# Patient Record
Sex: FEMALE | Race: WHITE | NOT HISPANIC OR LATINO | Employment: FULL TIME | URBAN - METROPOLITAN AREA
[De-identification: names, ages, dates, MRNs, and addresses within clinical notes are randomized per-mention and may not be internally consistent; named-entity substitution may affect disease eponyms.]

---

## 2019-03-08 ENCOUNTER — HOSPITAL ENCOUNTER (EMERGENCY)
Facility: HOSPITAL | Age: 24
Discharge: HOME | End: 2019-03-08
Attending: EMERGENCY MEDICINE | Admitting: EMERGENCY MEDICINE
Payer: COMMERCIAL

## 2019-03-08 VITALS
TEMPERATURE: 96.8 F | OXYGEN SATURATION: 97 % | RESPIRATION RATE: 16 BRPM | DIASTOLIC BLOOD PRESSURE: 73 MMHG | SYSTOLIC BLOOD PRESSURE: 138 MMHG | HEART RATE: 77 BPM | WEIGHT: 222.66 LBS

## 2019-03-08 DIAGNOSIS — N39.0 UTI (URINARY TRACT INFECTION): Primary | ICD-10-CM

## 2019-03-08 LAB
BACTERIA UR QL AUTO: ABNORMAL /HPF
BILIRUB UR QL STRIP: NEGATIVE
CLARITY UR: CLEAR
COLOR UR: ABNORMAL
EXT PREG TEST URINE: NEGATIVE
GLUCOSE UR STRIP-MCNC: NEGATIVE MG/DL
HGB UR QL STRIP.AUTO: 10
KETONES UR STRIP-MCNC: NEGATIVE MG/DL
LEUKOCYTE ESTERASE UR QL STRIP: 100
MUCOUS THREADS UR QL AUTO: ABNORMAL
NITRITE UR QL STRIP: NEGATIVE
NON-SQ EPI CELLS URNS QL MICRO: ABNORMAL /HPF
PH UR STRIP.AUTO: 5 [PH]
PROT UR STRIP-MCNC: NEGATIVE MG/DL
RBC #/AREA URNS AUTO: ABNORMAL /HPF
SP GR UR STRIP.AUTO: 1.02 (ref 1–1.04)
UROBILINOGEN UA: NEGATIVE MG/DL
WBC #/AREA URNS AUTO: ABNORMAL /HPF

## 2019-03-08 PROCEDURE — 81025 URINE PREGNANCY TEST: CPT | Performed by: PHYSICIAN ASSISTANT

## 2019-03-08 PROCEDURE — 87186 SC STD MICRODIL/AGAR DIL: CPT | Performed by: PHYSICIAN ASSISTANT

## 2019-03-08 PROCEDURE — 81003 URINALYSIS AUTO W/O SCOPE: CPT | Performed by: PHYSICIAN ASSISTANT

## 2019-03-08 PROCEDURE — 81001 URINALYSIS AUTO W/SCOPE: CPT | Performed by: PHYSICIAN ASSISTANT

## 2019-03-08 PROCEDURE — 87077 CULTURE AEROBIC IDENTIFY: CPT | Performed by: PHYSICIAN ASSISTANT

## 2019-03-08 PROCEDURE — 99284 EMERGENCY DEPT VISIT MOD MDM: CPT

## 2019-03-08 PROCEDURE — 87086 URINE CULTURE/COLONY COUNT: CPT | Performed by: PHYSICIAN ASSISTANT

## 2019-03-08 RX ORDER — NAPROXEN 500 MG/1
500 TABLET ORAL 2 TIMES DAILY WITH MEALS
Qty: 20 TABLET | Refills: 0 | Status: SHIPPED | OUTPATIENT
Start: 2019-03-08 | End: 2019-05-20 | Stop reason: ALTCHOICE

## 2019-03-08 RX ORDER — CEPHALEXIN 500 MG/1
500 CAPSULE ORAL EVERY 8 HOURS SCHEDULED
Qty: 30 CAPSULE | Refills: 0 | Status: SHIPPED | OUTPATIENT
Start: 2019-03-08 | End: 2019-03-18

## 2019-03-08 NOTE — ED PROVIDER NOTES
History  Chief Complaint   Patient presents with    Abdominal Pain     " on the left side since Tuesday, I thought it was a UTI and took AZO for it"  denies V/D  denies fevers     Patient is a 26year old female who presents today with a chief complaint of left lower quadrant abdominal pain which has been intermittent since Tuesday of this past week (3 days ago)  Patient reports she was drinking alcohol the night before and believed she was simply experiencing the after-effects of the alcohol on Tuesday  Patient reports the pain is a 'squeezing' in the lower left and mid pelvic area and was initially associated with dysuria for which she tried AZO over the counter medication which was ineffective at relieving the abdominal pain  Patient reports her abdominal pain was associated with one episode of vomiting however denies any further nausea, vomiting, diarrhea, or constipation  Patient reports she is able to eat and drink without problem and has had normal bowel movements with the last one being yesterday and normal in nature  Patient reports she is not sexually active and is not experiencing any vaginal discharge, discomfort, genital rashes or lesions  Patient denies any back or flank  pain associated with her pelvic / abdominal pain        Abdominal Pain   Pain location:  LLQ and suprapubic  Pain quality: squeezing    Pain radiates to:  Suprapubic region  Pain severity:  Mild  Onset quality:  Gradual  Duration:  3 days  Timing:  Intermittent  Progression:  Waxing and waning  Chronicity:  New  Context: alcohol use    Relieved by:  Nothing  Worsened by:  Nothing  Ineffective treatments:  OTC medications (azo)  Associated symptoms: dysuria ( few episodes at onset of abd pain) and vomiting ( one episode at onset of abd pain)    Associated symptoms: no chest pain, no chills, no constipation, no diarrhea, no fatigue, no fever, no hematemesis, no hematochezia, no hematuria, no nausea, no shortness of breath, no sore throat and no vaginal discharge        None       History reviewed  No pertinent past medical history  History reviewed  No pertinent surgical history  History reviewed  No pertinent family history  I have reviewed and agree with the history as documented  Social History     Tobacco Use    Smoking status: Never Smoker    Smokeless tobacco: Never Used   Substance Use Topics    Alcohol use: Yes    Drug use: Not Currently        Review of Systems   Constitutional: Negative for chills, fatigue and fever  HENT: Negative for congestion, ear pain, rhinorrhea and sore throat  Eyes: Negative for redness  Respiratory: Negative for chest tightness and shortness of breath  Cardiovascular: Negative for chest pain and palpitations  Gastrointestinal: Positive for abdominal pain and vomiting ( one episode at onset of abd pain)  Negative for constipation, diarrhea, hematemesis, hematochezia and nausea  Genitourinary: Positive for dysuria ( few episodes at onset of abd pain)  Negative for difficulty urinating, hematuria and vaginal discharge  Musculoskeletal: Negative  Skin: Negative for rash  Neurological: Negative for dizziness, syncope, light-headedness and numbness  Physical Exam  Physical Exam   Constitutional: She is oriented to person, place, and time  She appears well-developed and well-nourished  Non-toxic appearance  She does not appear ill  No distress  HENT:   Head: Normocephalic  Eyes: No scleral icterus  Cardiovascular: Normal rate and regular rhythm  Pulmonary/Chest: Effort normal and breath sounds normal  No stridor  Abdominal: Soft  Normal appearance and bowel sounds are normal  She exhibits no distension  There is tenderness in the suprapubic area and left lower quadrant  There is no rigidity, no rebound, no CVA tenderness, no tenderness at McBurney's point and negative De La Torre's sign  Musculoskeletal: Normal range of motion     Neurological: She is alert and oriented to person, place, and time  Skin: Skin is warm and dry  Capillary refill takes less than 2 seconds  Psychiatric: She has a normal mood and affect  Nursing note and vitals reviewed  Vital Signs  ED Triage Vitals [03/08/19 0802]   Temperature Pulse Respirations Blood Pressure SpO2   (!) 96 8 °F (36 °C) 77 16 138/73 97 %      Temp Source Heart Rate Source Patient Position - Orthostatic VS BP Location FiO2 (%)   Tympanic Monitor Sitting Left arm --      Pain Score       7           Vitals:    03/08/19 0802   BP: 138/73   Pulse: 77   Patient Position - Orthostatic VS: Sitting       Visual Acuity      ED Medications  Medications - No data to display    Diagnostic Studies  Results Reviewed     Procedure Component Value Units Date/Time    Urine Microscopic [[id number]]  (Abnormal) Collected:  03/08/19 0813    Lab Status:  Final result Specimen:  Urine, Clean Catch Updated:  03/08/19 0845     RBC, UA 1-2 /hpf      WBC, UA 10-20 /hpf      Epithelial Cells Moderate /hpf      Bacteria, UA Moderate /hpf      MUCUS THREADS Occasional    Urine culture [[id number]] Collected:  03/08/19 0813    Lab Status:   In process Specimen:  Urine, Clean Catch Updated:  03/08/19 0845    UA w Reflex to Microscopic w Reflex to Culture [[id number]]  (Abnormal) Collected:  03/08/19 0813    Lab Status:  Final result Specimen:  Urine, Clean Catch Updated:  03/08/19 0830     Color, UA Straw     Clarity, UA Clear     Specific [place], UA 1 020     pH, UA 5 0     Leukocytes,  0     Nitrite, UA Negative     Protein, UA Negative mg/dl      Glucose, UA Negative mg/dl      Ketones, UA Negative mg/dl      Bilirubin, UA Negative     Blood, UA 10 0     UROBILINOGEN UA Negative mg/dL     POCT pregnancy, urine [[id number]]  (Normal) Resulted:  03/08/19 0816    Lab Status:  Final result Updated:  03/08/19 0816     EXT PREG TEST UR (Ref: Negative) Negative                 No orders to display              Procedures  Procedures       Phone Contacts  ED Phone Contact    ED Course                               MDM    Disposition  Final diagnoses:   UTI (urinary tract infection)     Time reflects when diagnosis was documented in both MDM as applicable and the Disposition within this note     Time User Action Codes Description Comment    3/8/2019  8:47 AM Dean Egan Add [N39 0] UTI (urinary tract infection)       ED Disposition     ED Disposition Condition Date/Time Comment    Discharge Good Fri Mar 8, 2019  8:47 AM Humberto Ramirez discharge to home/self care  Follow-up Information     Follow up With Specialties Details Why Noris Pham MD Internal Medicine Schedule an appointment as soon as possible for a visit  As needed [address] [place] [id number]  [phone]            Patient's Medications   Discharge Prescriptions    CEPHALEXIN (KEFLEX) 500 MG CAPSULE    Take 1 capsule (500 mg total) by mouth every 8 (eight) hours for 10 days       Start Date: 3/8/2019  End Date: 3/18/2019       Order Dose: 500 mg       Quantity: 30 capsule    Refills: 0    NAPROXEN (EC NAPROSYN) 500 MG EC TABLET    Take 1 tablet (500 mg total) by mouth 2 (two) times a day with meals       Start Date: 3/8/2019  End Date: 3/7/2020       Order Dose: 500 mg       Quantity: 20 tablet    Refills: 0     No discharge procedures on file      ED Provider  Electronically Signed by           Azalia Corado PA-C  03/08/19 4035

## 2019-03-10 LAB — BACTERIA UR CULT: ABNORMAL

## 2019-05-16 RX ORDER — CYCLOBENZAPRINE HCL 5 MG
TABLET ORAL
COMMUNITY
Start: 2017-05-04 | End: 2019-05-20 | Stop reason: ALTCHOICE

## 2019-05-20 ENCOUNTER — OFFICE VISIT (OUTPATIENT)
Dept: FAMILY MEDICINE CLINIC | Facility: CLINIC | Age: 24
End: 2019-05-20
Payer: COMMERCIAL

## 2019-05-20 VITALS
RESPIRATION RATE: 16 BRPM | DIASTOLIC BLOOD PRESSURE: 76 MMHG | BODY MASS INDEX: 37.46 KG/M2 | WEIGHT: 219.4 LBS | SYSTOLIC BLOOD PRESSURE: 130 MMHG | OXYGEN SATURATION: 99 % | HEART RATE: 80 BPM | HEIGHT: 64 IN | TEMPERATURE: 98.7 F

## 2019-05-20 DIAGNOSIS — Z00.00 ROUTINE GENERAL MEDICAL EXAMINATION AT A HEALTH CARE FACILITY: Primary | ICD-10-CM

## 2019-05-20 DIAGNOSIS — Z12.4 CERVICAL CANCER SCREENING: ICD-10-CM

## 2019-05-20 PROCEDURE — 99395 PREV VISIT EST AGE 18-39: CPT | Performed by: INTERNAL MEDICINE

## 2020-04-09 ENCOUNTER — OFFICE VISIT (OUTPATIENT)
Dept: FAMILY MEDICINE CLINIC | Facility: CLINIC | Age: 25
End: 2020-04-09
Payer: COMMERCIAL

## 2020-04-09 VITALS
OXYGEN SATURATION: 98 % | WEIGHT: 221.6 LBS | DIASTOLIC BLOOD PRESSURE: 74 MMHG | TEMPERATURE: 98.2 F | HEART RATE: 80 BPM | HEIGHT: 64 IN | SYSTOLIC BLOOD PRESSURE: 128 MMHG | BODY MASS INDEX: 37.83 KG/M2 | RESPIRATION RATE: 14 BRPM

## 2020-04-09 DIAGNOSIS — Z11.8 SCREENING FOR CHLAMYDIAL DISEASE: ICD-10-CM

## 2020-04-09 DIAGNOSIS — Z11.4 SCREENING FOR HUMAN IMMUNODEFICIENCY VIRUS: ICD-10-CM

## 2020-04-09 DIAGNOSIS — Z00.01 ENCOUNTER FOR GENERAL ADULT MEDICAL EXAMINATION WITH ABNORMAL FINDINGS: Primary | ICD-10-CM

## 2020-04-09 DIAGNOSIS — K21.9 GASTROESOPHAGEAL REFLUX DISEASE WITHOUT ESOPHAGITIS: ICD-10-CM

## 2020-04-09 DIAGNOSIS — E01.0 THYROMEGALY: ICD-10-CM

## 2020-04-09 DIAGNOSIS — Z12.4 CERVICAL CANCER SCREENING: ICD-10-CM

## 2020-04-09 DIAGNOSIS — R53.83 FATIGUE, UNSPECIFIED TYPE: ICD-10-CM

## 2020-04-09 PROCEDURE — 99214 OFFICE O/P EST MOD 30 MIN: CPT | Performed by: INTERNAL MEDICINE

## 2020-04-09 PROCEDURE — 99395 PREV VISIT EST AGE 18-39: CPT | Performed by: INTERNAL MEDICINE

## 2020-04-09 PROCEDURE — 3008F BODY MASS INDEX DOCD: CPT | Performed by: INTERNAL MEDICINE

## 2020-04-09 PROCEDURE — 93000 ELECTROCARDIOGRAM COMPLETE: CPT | Performed by: INTERNAL MEDICINE

## 2020-04-09 PROCEDURE — 1036F TOBACCO NON-USER: CPT | Performed by: INTERNAL MEDICINE

## 2020-04-09 RX ORDER — PHENTERMINE HYDROCHLORIDE 15 MG/1
15 CAPSULE ORAL EVERY MORNING
Qty: 30 CAPSULE | Refills: 1 | Status: SHIPPED | OUTPATIENT
Start: 2020-04-09 | End: 2020-06-17 | Stop reason: ALTCHOICE

## 2020-04-09 RX ORDER — TOPIRAMATE 15 MG/1
15 CAPSULE, COATED PELLETS ORAL DAILY
Qty: 30 CAPSULE | Refills: 1 | Status: SHIPPED | OUTPATIENT
Start: 2020-04-09 | End: 2020-05-01

## 2020-05-01 RX ORDER — TOPIRAMATE 15 MG/1
15 CAPSULE, COATED PELLETS ORAL DAILY
Qty: 30 CAPSULE | Refills: 1 | Status: SHIPPED | OUTPATIENT
Start: 2020-05-01 | End: 2020-05-27

## 2020-05-22 ENCOUNTER — HOSPITAL ENCOUNTER (OUTPATIENT)
Dept: NON INVASIVE DIAGNOSTICS | Facility: HOSPITAL | Age: 25
Discharge: HOME | End: 2020-05-22
Payer: COMMERCIAL

## 2020-05-22 ENCOUNTER — HOSPITAL ENCOUNTER (OUTPATIENT)
Dept: ULTRASOUND IMAGING | Facility: HOSPITAL | Age: 25
Discharge: HOME | End: 2020-05-22
Payer: COMMERCIAL

## 2020-05-22 DIAGNOSIS — R53.83 FATIGUE, UNSPECIFIED TYPE: ICD-10-CM

## 2020-05-22 DIAGNOSIS — R93.1 ABNORMAL ECHOCARDIOGRAM: Primary | ICD-10-CM

## 2020-05-22 DIAGNOSIS — E01.0 THYROMEGALY: ICD-10-CM

## 2020-05-22 PROCEDURE — 93306 TTE W/DOPPLER COMPLETE: CPT

## 2020-05-22 PROCEDURE — 93306 TTE W/DOPPLER COMPLETE: CPT | Performed by: INTERNAL MEDICINE

## 2020-05-22 PROCEDURE — 76536 US EXAM OF HEAD AND NECK: CPT

## 2020-05-27 RX ORDER — TOPIRAMATE 15 MG/1
15 CAPSULE, COATED PELLETS ORAL DAILY
Qty: 90 CAPSULE | Refills: 1 | Status: SHIPPED | OUTPATIENT
Start: 2020-05-27 | End: 2020-06-17 | Stop reason: ALTCHOICE

## 2020-06-01 ENCOUNTER — TELEPHONE (OUTPATIENT)
Dept: FAMILY MEDICINE CLINIC | Facility: CLINIC | Age: 25
End: 2020-06-01

## 2020-06-17 ENCOUNTER — CONSULT (OUTPATIENT)
Dept: CARDIOLOGY CLINIC | Facility: CLINIC | Age: 25
End: 2020-06-17
Payer: COMMERCIAL

## 2020-06-17 ENCOUNTER — TELEPHONE (OUTPATIENT)
Dept: OTHER | Facility: OTHER | Age: 25
End: 2020-06-17

## 2020-06-17 VITALS
DIASTOLIC BLOOD PRESSURE: 70 MMHG | WEIGHT: 206 LBS | HEART RATE: 52 BPM | BODY MASS INDEX: 35.36 KG/M2 | SYSTOLIC BLOOD PRESSURE: 110 MMHG | TEMPERATURE: 98.3 F

## 2020-06-17 DIAGNOSIS — E66.09 CLASS 2 OBESITY DUE TO EXCESS CALORIES WITHOUT SERIOUS COMORBIDITY WITH BODY MASS INDEX (BMI) OF 35.0 TO 35.9 IN ADULT: ICD-10-CM

## 2020-06-17 DIAGNOSIS — R07.9 CHEST PAIN, UNSPECIFIED TYPE: ICD-10-CM

## 2020-06-17 DIAGNOSIS — R06.00 DYSPNEA ON EXERTION: ICD-10-CM

## 2020-06-17 DIAGNOSIS — I42.2 OTHER HYPERTROPHIC CARDIOMYOPATHY (HCC): Primary | ICD-10-CM

## 2020-06-17 DIAGNOSIS — R93.1 ABNORMAL ECHOCARDIOGRAM: ICD-10-CM

## 2020-06-17 DIAGNOSIS — R00.2 PALPITATIONS: ICD-10-CM

## 2020-06-17 PROBLEM — E66.812 CLASS 2 OBESITY IN ADULT: Status: ACTIVE | Noted: 2020-06-17

## 2020-06-17 PROBLEM — E66.9 CLASS 2 OBESITY IN ADULT: Status: ACTIVE | Noted: 2020-06-17

## 2020-06-17 PROBLEM — R06.09 DYSPNEA ON EXERTION: Status: ACTIVE | Noted: 2020-06-17

## 2020-06-17 PROCEDURE — 99244 OFF/OP CNSLTJ NEW/EST MOD 40: CPT | Performed by: INTERNAL MEDICINE

## 2020-06-17 PROCEDURE — 93000 ELECTROCARDIOGRAM COMPLETE: CPT | Performed by: INTERNAL MEDICINE

## 2020-06-17 RX ORDER — METOPROLOL TARTRATE 50 MG/1
50 TABLET, FILM COATED ORAL ONCE
Qty: 1 TABLET | Refills: 0 | Status: SHIPPED | OUTPATIENT
Start: 2020-06-17 | End: 2020-09-28 | Stop reason: ALTCHOICE

## 2020-06-29 ENCOUNTER — HOSPITAL ENCOUNTER (OUTPATIENT)
Dept: RADIOLOGY | Facility: HOSPITAL | Age: 25
Discharge: HOME | End: 2020-06-29
Attending: INTERNAL MEDICINE
Payer: COMMERCIAL

## 2020-06-29 DIAGNOSIS — I42.2 OTHER HYPERTROPHIC CARDIOMYOPATHY (HCC): ICD-10-CM

## 2020-06-29 DIAGNOSIS — R93.1 ABNORMAL ECHOCARDIOGRAM: ICD-10-CM

## 2020-06-29 DIAGNOSIS — R07.9 CHEST PAIN, UNSPECIFIED TYPE: ICD-10-CM

## 2020-06-29 PROCEDURE — A9585 GADOBUTROL INJECTION: HCPCS | Performed by: INTERNAL MEDICINE

## 2020-06-29 PROCEDURE — 75561 CARDIAC MRI FOR MORPH W/DYE: CPT

## 2020-06-29 RX ADMIN — GADOBUTROL 19 ML: 604.72 INJECTION INTRAVENOUS at 10:49

## 2020-07-13 DIAGNOSIS — I42.2 HYPERTROPHIC CARDIOMYOPATHY (HCC): Primary | ICD-10-CM

## 2020-07-17 ENCOUNTER — TELEPHONE (OUTPATIENT)
Dept: CARDIOLOGY CLINIC | Facility: CLINIC | Age: 25
End: 2020-07-17

## 2020-07-31 DIAGNOSIS — I42.2 OTHER HYPERTROPHIC CARDIOMYOPATHY (HCC): Primary | ICD-10-CM

## 2020-08-03 ENCOUNTER — TELEPHONE (OUTPATIENT)
Dept: CARDIOLOGY CLINIC | Facility: CLINIC | Age: 25
End: 2020-08-03

## 2020-08-03 ENCOUNTER — CLINICAL SUPPORT (OUTPATIENT)
Dept: CARDIOLOGY CLINIC | Facility: CLINIC | Age: 25
End: 2020-08-03
Payer: COMMERCIAL

## 2020-08-03 DIAGNOSIS — R00.2 PALPITATIONS: ICD-10-CM

## 2020-08-03 DIAGNOSIS — I42.2 OTHER HYPERTROPHIC CARDIOMYOPATHY (HCC): ICD-10-CM

## 2020-08-03 PROCEDURE — 0298T PR EXT ECG > 48HR TO 21 DAY REVIEW AND INTERPRETATN: CPT | Performed by: INTERNAL MEDICINE

## 2020-08-03 NOTE — TELEPHONE ENCOUNTER
Patient received Lafonda Merle but continued to sweat too much that "it kept falling off"  She wore it only three days and sent it back  Patient wanted to try again, so another order was put in by Dr Fletcher Francis for another 14 days  [facility] were sending overnight to [facility]  I reordered it on Fri for delivery Sat

## 2020-08-03 NOTE — RESULT ENCOUNTER NOTE
Agree with preliminary interpretation  Sinus rhythm  beats per minute  No PACs or PVCs  No other arrhythmias

## 2020-09-11 ENCOUNTER — HOSPITAL ENCOUNTER (EMERGENCY)
Facility: HOSPITAL | Age: 25
Discharge: HOME | End: 2020-09-11
Attending: EMERGENCY MEDICINE | Admitting: EMERGENCY MEDICINE
Payer: COMMERCIAL

## 2020-09-11 VITALS
TEMPERATURE: 97.7 F | BODY MASS INDEX: 34.87 KG/M2 | RESPIRATION RATE: 18 BRPM | DIASTOLIC BLOOD PRESSURE: 61 MMHG | WEIGHT: 196.87 LBS | SYSTOLIC BLOOD PRESSURE: 135 MMHG | HEART RATE: 65 BPM | OXYGEN SATURATION: 100 %

## 2020-09-11 DIAGNOSIS — R19.7 DIARRHEA: Primary | ICD-10-CM

## 2020-09-11 DIAGNOSIS — R05.9 COUGH: ICD-10-CM

## 2020-09-11 LAB
ANION GAP SERPL CALCULATED.3IONS-SCNC: 2 MMOL/L (ref 4–13)
BASOPHILS # BLD AUTO: 0.05 THOUSANDS/ΜL (ref 0–0.1)
BASOPHILS NFR BLD AUTO: 1 % (ref 0–1)
BUN SERPL-MCNC: 10 MG/DL (ref 5–25)
CALCIUM SERPL-MCNC: 8.6 MG/DL (ref 8.3–10.1)
CHLORIDE SERPL-SCNC: 104 MMOL/L (ref 100–108)
CO2 SERPL-SCNC: 27 MMOL/L (ref 21–32)
CREAT SERPL-MCNC: 0.74 MG/DL (ref 0.6–1.3)
EOSINOPHIL # BLD AUTO: 0.04 THOUSAND/ΜL (ref 0–0.61)
EOSINOPHIL NFR BLD AUTO: 0 % (ref 0–6)
ERYTHROCYTE [DISTWIDTH] IN BLOOD BY AUTOMATED COUNT: 12.4 % (ref 11.6–15.1)
GFR SERPL CREATININE-BSD FRML MDRD: 115 ML/MIN/1.73SQ M
GLUCOSE SERPL-MCNC: 92 MG/DL (ref 65–140)
HCT VFR BLD AUTO: 40.7 % (ref 34.8–46.1)
HGB BLD-MCNC: 12.7 G/DL (ref 11.5–15.4)
IMM GRANULOCYTES # BLD AUTO: 0.06 THOUSAND/UL (ref 0–0.2)
IMM GRANULOCYTES NFR BLD AUTO: 1 % (ref 0–2)
LYMPHOCYTES # BLD AUTO: 1.47 THOUSANDS/ΜL (ref 0.6–4.47)
LYMPHOCYTES NFR BLD AUTO: 15 % (ref 14–44)
MCH RBC QN AUTO: 28.9 PG (ref 26.8–34.3)
MCHC RBC AUTO-ENTMCNC: 31.2 G/DL (ref 31.4–37.4)
MCV RBC AUTO: 93 FL (ref 82–98)
MONOCYTES # BLD AUTO: 0.59 THOUSAND/ΜL (ref 0.17–1.22)
MONOCYTES NFR BLD AUTO: 6 % (ref 4–12)
NEUTROPHILS # BLD AUTO: 7.95 THOUSANDS/ΜL (ref 1.85–7.62)
NEUTS SEG NFR BLD AUTO: 77 % (ref 43–75)
NRBC BLD AUTO-RTO: 0 /100 WBCS
PLATELET # BLD AUTO: 231 THOUSANDS/UL (ref 149–390)
PMV BLD AUTO: 11.1 FL (ref 8.9–12.7)
POTASSIUM SERPL-SCNC: 3.6 MMOL/L (ref 3.5–5.3)
RBC # BLD AUTO: 4.4 MILLION/UL (ref 3.81–5.12)
SODIUM SERPL-SCNC: 133 MMOL/L (ref 136–145)
WBC # BLD AUTO: 10.16 THOUSAND/UL (ref 4.31–10.16)

## 2020-09-11 PROCEDURE — 85025 COMPLETE CBC W/AUTO DIFF WBC: CPT | Performed by: EMERGENCY MEDICINE

## 2020-09-11 PROCEDURE — 80048 BASIC METABOLIC PNL TOTAL CA: CPT | Performed by: EMERGENCY MEDICINE

## 2020-09-11 PROCEDURE — 36415 COLL VENOUS BLD VENIPUNCTURE: CPT | Performed by: EMERGENCY MEDICINE

## 2020-09-11 PROCEDURE — 99284 EMERGENCY DEPT VISIT MOD MDM: CPT

## 2020-09-11 PROCEDURE — 99282 EMERGENCY DEPT VISIT SF MDM: CPT | Performed by: EMERGENCY MEDICINE

## 2020-09-11 NOTE — DISCHARGE INSTRUCTIONS
If cough continues to worsen and you are worried about potential covid, call family doctor for possible testing  At this point, based on your symptoms, I have a low suspicion for covid  Continue to drink plenty of fluids  You may try over the counter imodium

## 2020-09-11 NOTE — ED NOTES
Pt stating she does not want IV at this time  Provider aware       Brian Montana, JUDI  09/11/20 5725

## 2020-09-11 NOTE — ED NOTES
Pt reporting dizziness since walking back to room, which has gotten worse since laying down in the bed  Provider made aware       Ruth Crani RN  09/11/20 9051

## 2020-09-11 NOTE — ED PROVIDER NOTES
History  Chief Complaint   Patient presents with    Diarrhea     with diarrhea x4 days weak and shaky x3 days started with cough today     This is an otherwise healthy 72-year-old female who presents with diarrhea  Over the past 3-4 days, the patient has been experiencing worsening diarrhea  She has been experiencing 4-5 episodes of a light brown, loose stool per day  Denies any other symptoms  She does work in a long-term care facility  No known COVID exposures  Denies any sick contacts  Denies any history of C diff  Denies any recent antibiotic use  Denies any personal or family history of ulcerative colitis or Crohn's disease  The patient did start with a mild cough today  She has no other symptoms  Denies fever/chills, nausea/vomiting, lightheadedness/dizziness, numbness/weakness, headache, change in vision, URI symptoms, neck pain, chest pain, palpitations, shortness of breath, back pain, flank pain, abdominal pain, hematochezia, melena, dysuria, hematuria, abnormal vaginal discharge/bleeding  Prior to Admission Medications   Prescriptions Last Dose Informant Patient Reported? Taking?   metoprolol tartrate (LOPRESSOR) 50 mg tablet   No No   Sig: Take 1 tablet (50 mg total) by mouth once for 1 dose Take 30-60 minutes prior to MRI test      Facility-Administered Medications: None       Past Medical History:   Diagnosis Date    Disease of thyroid gland        History reviewed  No pertinent surgical history  Family History   Problem Relation Age of Onset    No Known Problems Mother     No Known Problems Father      I have reviewed and agree with the history as documented      E-Cigarette/Vaping    E-Cigarette Use Never User      E-Cigarette/Vaping Substances    Nicotine No     THC No     CBD No     Flavoring No      Social History     Tobacco Use    Smoking status: Never Smoker    Smokeless tobacco: Never Used   Substance Use Topics    Alcohol use: Yes     Drinks per session: 1 or 2     Binge frequency: Never    Drug use: Not Currently       Review of Systems   Constitutional: Negative for chills and fever  HENT: Negative for rhinorrhea, sore throat and trouble swallowing  Eyes: Negative for photophobia and visual disturbance  Respiratory: Positive for cough  Negative for chest tightness and shortness of breath  Cardiovascular: Negative for chest pain, palpitations and leg swelling  Gastrointestinal: Positive for diarrhea  Negative for abdominal pain, blood in stool, nausea and vomiting  Endocrine: Negative for polyuria  Genitourinary: Negative for dysuria, flank pain, hematuria, vaginal bleeding and vaginal discharge  Musculoskeletal: Negative for back pain and neck pain  Skin: Negative for color change and rash  Allergic/Immunologic: Negative for immunocompromised state  Neurological: Negative for dizziness, weakness, light-headedness, numbness and headaches  All other systems reviewed and are negative  Physical Exam  Physical Exam  Constitutional:       General: She is not in acute distress  Appearance: Normal appearance  She is well-developed  HENT:      Mouth/Throat:      Pharynx: Uvula midline  Eyes:      Conjunctiva/sclera: Conjunctivae normal       Pupils: Pupils are equal, round, and reactive to light  Neck:      Thyroid: No thyroid mass or thyromegaly  Trachea: Trachea normal    Cardiovascular:      Rate and Rhythm: Normal rate and regular rhythm  Pulses: Normal pulses  Heart sounds: Normal heart sounds  No murmur  Pulmonary:      Effort: Pulmonary effort is normal       Breath sounds: Normal breath sounds  Abdominal:      General: Bowel sounds are normal       Palpations: Abdomen is soft  Tenderness: There is no abdominal tenderness  There is no guarding or rebound  Skin:     General: Skin is warm and dry  Neurological:      Mental Status: She is alert     Psychiatric:         Speech: Speech normal  Behavior: Behavior normal  Behavior is cooperative  Thought Content:  Thought content normal          Vital Signs  ED Triage Vitals [09/11/20 1521]   Temperature Pulse Respirations Blood Pressure SpO2   97 7 °F (36 5 °C) 65 18 135/61 100 %      Temp Source Heart Rate Source Patient Position - Orthostatic VS BP Location FiO2 (%)   Temporal Monitor Sitting Right arm --      Pain Score       5           Vitals:    09/11/20 1521   BP: 135/61   Pulse: 65   Patient Position - Orthostatic VS: Sitting         Visual Acuity      ED Medications  Medications   sodium chloride 0 9 % bolus 1,000 mL (1,000 mL Intravenous Not Given 9/11/20 1623)       Diagnostic Studies  Results Reviewed     Procedure Component Value Units Date/Time    Basic metabolic panel [[id number]]  (Abnormal) Collected:  09/11/20 1554    Lab Status:  Final result Specimen:  Blood from Arm, Right Updated:  09/11/20 1609     Sodium 133 mmol/L      Potassium 3 6 mmol/L      Chloride 104 mmol/L      CO2 27 mmol/L      ANION GAP 2 mmol/L      BUN 10 mg/dL      Creatinine 0 74 mg/dL      Glucose 92 mg/dL      Calcium 8 6 mg/dL      eGFR 115 ml/min/1 73sq m     Narrative:       [place] guidelines for Chronic Kidney Disease (CKD):     Stage 1 with normal or high GFR (GFR > 90 mL/min/1 73 square meters)    Stage 2 Mild CKD (GFR = 60-89 mL/min/1 73 square meters)    Stage 3A Moderate CKD (GFR = 45-59 mL/min/1 73 square meters)    Stage 3B Moderate CKD (GFR = 30-44 mL/min/1 73 square meters)    Stage 4 Severe CKD (GFR = 15-29 mL/min/1 73 square meters)    Stage 5 End Stage CKD (GFR <15 mL/min/1 73 square meters)  Note: GFR calculation is accurate only with a steady state creatinine    CBC and differential [[id number]]  (Abnormal) Collected:  09/11/20 1554    Lab Status:  Final result Specimen:  Blood from Arm, Right Updated:  09/11/20 1558     WBC 10 16 Thousand/uL      RBC 4 40 Million/uL      Hemoglobin 12 7 g/dL      Hematocrit 40 7 %      MCV 93 fL      MCH 28 9 pg      MCHC 31 2 g/dL      RDW 12 4 %      MPV 11 1 fL      Platelets 182 Thousands/uL      nRBC 0 /100 WBCs      Neutrophils Relative 77 %      Immat GRANS % 1 %      Lymphocytes Relative 15 %      Monocytes Relative 6 %      Eosinophils Relative 0 %      Basophils Relative 1 %      Neutrophils Absolute 7 95 Thousands/µL      Immature Grans Absolute 0 06 Thousand/uL      Lymphocytes Absolute 1 47 Thousands/µL      Monocytes Absolute 0 59 Thousand/µL      Eosinophils Absolute 0 04 Thousand/µL      Basophils Absolute 0 05 Thousands/µL     POCT pregnancy, urine [[id number]]     Lab Status:  No result                  No orders to display              Procedures  Procedures         ED Course                                       MDM  Number of Diagnoses or Management Options  Diagnosis management comments: Likely viral enteritis  Will check basic lab work and urine pregnancy  Patient instructed on supportive care and drink plenty of fluids  Work note  Disposition  Final diagnoses:   Diarrhea   Cough     Time reflects when diagnosis was documented in both MDM as applicable and the Disposition within this note     Time User Action Codes Description Comment    9/11/2020  4:20 PM Cain Baeza Add [R19 7] Diarrhea     9/11/2020  4:20 PM Cain Baeza Add [R05] Cough       ED Disposition     ED Disposition Condition Date/Time Comment    Discharge Stable Fri Sep 11, 2020  4:20 PM Judy Whittington discharge to home/self care              Follow-up Information     Follow up With Specialties Details Why Contact Info Additional Mode Madrid MD Internal Medicine Schedule an appointment as soon as possible for a visit   [address] [facility] [phone]  Emergency Department Emergency Medicine Go to  If symptoms worsen [place] [id number] [phone] [place] [facility], [address] , [place], [place], [address]          Discharge Medication List as of 9/11/2020  4:22 PM      CONTINUE these medications which have NOT CHANGED    Details   metoprolol tartrate (LOPRESSOR) 50 mg tablet Take 1 tablet (50 mg total) by mouth once for 1 dose Take 30-60 minutes prior to MRI test, Starting Wed 6/17/2020, Normal           No discharge procedures on file      PDMP Review       Value Time User    PDMP Reviewed  Yes 4/9/2020  2:11 Srinivasan Wagner MD          ED Provider  Electronically Signed by           Cesar Rodríguez MD  09/11/20 [id number] Wilson MD [place]  09/11/20 7220

## 2020-09-17 ENCOUNTER — CLINICAL SUPPORT (OUTPATIENT)
Dept: CARDIOLOGY CLINIC | Facility: CLINIC | Age: 25
End: 2020-09-17
Payer: COMMERCIAL

## 2020-09-17 DIAGNOSIS — R00.2 PALPITATIONS: Primary | ICD-10-CM

## 2020-09-17 PROCEDURE — 0298T PR EXT ECG > 48HR TO 21 DAY REVIEW AND INTERPRETATN: CPT | Performed by: INTERNAL MEDICINE

## 2020-09-24 NOTE — RESULT ENCOUNTER NOTE
Agree with preliminary interpretation  Patient had sinus rhythm  beats per minute  She had 4 events during which she was in sinus rhythm at a range of rates between 69 and 101 beats per minute  No other arrhythmias occurred

## 2020-09-28 ENCOUNTER — OFFICE VISIT (OUTPATIENT)
Dept: CARDIOLOGY CLINIC | Facility: CLINIC | Age: 25
End: 2020-09-28
Payer: COMMERCIAL

## 2020-09-28 VITALS
DIASTOLIC BLOOD PRESSURE: 68 MMHG | WEIGHT: 197.4 LBS | BODY MASS INDEX: 33.7 KG/M2 | HEIGHT: 64 IN | SYSTOLIC BLOOD PRESSURE: 102 MMHG | HEART RATE: 81 BPM | TEMPERATURE: 97.5 F

## 2020-09-28 DIAGNOSIS — R06.00 DYSPNEA ON EXERTION: ICD-10-CM

## 2020-09-28 DIAGNOSIS — R00.2 PALPITATIONS: ICD-10-CM

## 2020-09-28 DIAGNOSIS — I42.2 OTHER HYPERTROPHIC CARDIOMYOPATHY (HCC): Primary | ICD-10-CM

## 2020-09-28 DIAGNOSIS — E66.09 CLASS 1 OBESITY DUE TO EXCESS CALORIES WITHOUT SERIOUS COMORBIDITY WITH BODY MASS INDEX (BMI) OF 33.0 TO 33.9 IN ADULT: ICD-10-CM

## 2020-09-28 PROBLEM — E66.811 CLASS 1 OBESITY DUE TO EXCESS CALORIES WITHOUT SERIOUS COMORBIDITY WITH BODY MASS INDEX (BMI) OF 33.0 TO 33.9 IN ADULT: Status: ACTIVE | Noted: 2020-06-17

## 2020-09-28 PROCEDURE — 99215 OFFICE O/P EST HI 40 MIN: CPT | Performed by: INTERNAL MEDICINE

## 2020-09-28 NOTE — PROGRESS NOTES
Cardiology Follow Up    Aston Jameson  1995  [id number]  [address] [address]  [phone]    1  Asymmetrical septal hypertrophic cardiomyopathy [facility])  Ambulatory referral to Cardiology   2  Palpitations     3  Dyspnea on exertion     4  Class 1 obesity due to excess calories without serious comorbidity with body mass index (BMI) of 33 0 to 33 9 in adult         No specialty comments available  Interval History:  Patient complains of feeling lightheaded and dizzy frequently  She has fainted once in the past but not recently  She has occasional palpitations but they do not represent a problem  On an extended ambulatory Holter monitor, she had no significant arrhythmias  She pressed the event button several times but had sinus rhythm at the time  She denies chest pain or shortness of breath  Her father also has a cardiomyopathy and a defibrillator in place  Normal cared for by Dr Herberth Lopez at [facility]     Discussion/Summary:   1  Referral to Dr Ramon Powell for further evaluation and recommendations  2  Return in 6 months although if Dr Ramon Powell would prefer to follow the patient, I recommended that the patient call and cancel her visit with me      Patient Active Problem List   Diagnosis    Asymmetrical septal hypertrophic cardiomyopathy (HCC)    Class 1 obesity due to excess calories without serious comorbidity with body mass index (BMI) of 33 0 to 33 9 in adult    Palpitations    Dyspnea on exertion     Past Medical History:   Diagnosis Date    Disease of thyroid gland      Social History     Socioeconomic History    Marital status: Single     Spouse name: Not on file    Number of children: Not on file    Years of education: Not on file    Highest education level: Not on file   Occupational History    Not on file   Social Needs    Financial resource strain: Not hard at all    Food insecurity     Worry: Never true     Inability: Never true    Transportation needs     Medical: No     Non-medical: No   Tobacco Use    Smoking status: Never Smoker    Smokeless tobacco: Never Used   Substance and Sexual Activity    Alcohol use: Yes     Drinks per session: 1 or 2     Binge frequency: Never    Drug use: Not Currently    Sexual activity: Not Currently   Lifestyle    Physical activity     Days per week: Patient refused     Minutes per session: Patient refused    Stress: Not at all   Relationships    Social connections     Talks on phone: Not on file     Gets together: Not on file     Attends Congregation service: Not on file     Active member of club or organization: Not on file     Attends meetings of clubs or organizations: Not on file     Relationship status: Not on file    Intimate partner violence     Fear of current or ex partner: No     Emotionally abused: No     Physically abused: No     Forced sexual activity: No   Other Topics Concern    Not on file   Social History Narrative    Not on file      Family History   Problem Relation Age of Onset    No Known Problems Mother     No Known Problems Father      History reviewed  No pertinent surgical history  No current outpatient medications on file  Allergies   Allergen Reactions    Bee Venom Swelling       No results found for this visit on 09/28/20  Review of Systems:  Review of Systems   Respiratory: Negative for cough, choking, chest tightness, shortness of breath and wheezing  Cardiovascular: Negative for chest pain, palpitations and leg swelling  Musculoskeletal: Negative for gait problem  Skin: Negative for rash  Neurological: Positive for dizziness and light-headedness  Negative for tremors, syncope, weakness, numbness and headaches  Psychiatric/Behavioral: Negative for agitation and behavioral problems  The patient is not hyperactive          Physical Exam:  /68   Pulse 81   Temp 97 5 °F (36 4 °C)   Ht 5' 4" (1 626 m)   Wt 89 5 kg (197 lb 6 4 oz)   BMI 33 88 kg/m²   Physical Exam  Constitutional:       General: She is not in acute distress  Appearance: She is well-developed  HENT:      Head: Normocephalic and atraumatic  Neck:      Thyroid: No thyromegaly  Vascular: No JVD  Cardiovascular:      Rate and Rhythm: Normal rate and regular rhythm  Heart sounds: Normal heart sounds  No murmur  No friction rub  No gallop  Comments: No murmur with Valsalva maneuver  Pulmonary:      Effort: No respiratory distress  Breath sounds: No wheezing or rales  Musculoskeletal:         General: No swelling  Right lower leg: No edema  Left lower leg: No edema  Skin:     General: Skin is warm and dry  Neurological:      Mental Status: She is alert and oriented to person, place, and time  Psychiatric:         Behavior: Behavior normal            --------------------------------------------------------------------------------  ECHO:   Results for orders placed during the hospital encounter of 20   Echo complete with contrast if indicated    Skyler Ferreira   [facility]  [place], [address]  [phone]    Transthoracic Echocardiogram  2D, M-mode, Doppler, and Color Doppler    Study date:  22-May-2020    Patient: Josiah Elizabeth  MR number: [id number]  Account number: [de-identified]  : 1995  Age: 22 years  Gender: Female  Status: Outpatient  Location: [place] Echo 3  Height: 64 in  Weight: 220 4 lb  BP: 128/ 74 mmHg    Indications: Fatigue  Diagnoses: R53 83 - Other fatigue    Sonographer:  Nash Vazquez RDCS  Primary Physician:  Kathie Dejesus MD  Referring Physician:  Kathie Dejesus MD  Group:  [facility]  Interpreting Physician:  Monica Hall MD    IMPRESSIONS:  Technical quality: Good  Cardiac rhythm: Normal sinus    1   Severe asymmetric hypertrophy of interventricular septum with dynamic increase in flow velocities without definite evidence of outflow tract obstruction  Normal diastolic function  Ejection fraction estimated as around 65%  2  Normal right ventricular size and systolic function  3  Normal left and right atrial size  4  Trace aortic valve regurgitation  5  Trace mitral and tricuspid valve regurgitation  6  No obvious pulmonary hypertension  7  No pericardial effusion  No previous echocardiogram available for comparison  Consider cardiac MR for evaluation for asymmetric hypertrophic cardiomyopathy  SUMMARY    LEFT VENTRICLE:  Normal left ventricular cavity size, severe asymmetric left ventricular hypertrophy of the interventricular septum, normal left ventricular systolic function, hyperdynamic wall motion  There is systolic anterior motion of the anterior  mitral valve leaflet without definite outflow tract obstruction  Ejection fraction is determined as around 65% or greater  Doppler evaluation is consistent with normal diastolic function  RIGHT VENTRICLE:  Normal right ventricular size and systolic function  Right ventricular systolic pressure could not be estimated due to inadequate tricuspid regurgitation profile  LEFT ATRIUM:  Normal left atrial cavity size  Slightly aneurysmal interatrial septum  No color-flow Doppler evidence of interatrial shunts  RIGHT ATRIUM:  Normal right ventricular size and systolic function  Normal estimated right ventricular systolic pressure  MITRAL VALVE:  Normal mitral valve leaflet structure and motion  No mitral valve stenosis or regurgitation  AORTIC VALVE:  Tricuspid aortic valve with good cuspal separation  Trace aortic valve regurgitation  TRICUSPID VALVE:  Normal tricuspid valve morphology and leaflet separation  Trace tricuspid valve regurgitation  PULMONIC VALVE:  No significant pulmonic valve regurgitation  AORTA:  Aortic root and proximal ascending aorta are normal in size on 2D imaging      IVC, HEPATIC VEINS:  Inferior vena cava is normal in size and demonstrates appropriate respiratory phasic changes in diameter  PERICARDIUM:  No pericardial effusion  SUMMARY MEASUREMENTS  2D measurements:  Unspecified Anatomy:   %FS was 33 2 %  Ao Diam was 2 6 cm   EDV(Teich) was 74 6 ml   EF(Teich) was 62 2 %  ESV(Teich) was 28 2 ml   HR_4Ch_Q was 69 2 BPM   IVSd was 1 8 cm  LA Diam was 3 6 cm  LAAs A4C was 15 6 cm2  LAESV A-L A4C was  48 2 ml  LAESV MOD A4C was 46 2 ml  LALs A4C was 4 3 cm  LVCO_4Ch_Q was 3 9 L/min  LVEF_4Ch_Q was 64 6 %  LVESV MOD A4C was 21 3 ml  LVIDd was 4 1 cm  LVIDs was 2 7 cm  LVLd_4Ch_Q was 8 7 cm  LVLs A4C was 6 7 cm  LVLs_4Ch_Q was  7 1 cm  LVPWd was 0 9 cm  LVSV_4Ch_Q was 56 8 ml  LVVED_4Ch_Q was 87 9 ml   LVVES_4Ch_Q was 31 1 ml  RAAs was 9 1 cm2  RAESV A-L was 18 7 ml   RAESV MOD was 18 1 ml  RALs was 3 8 cm  RVIDd was 2 4 cm   SV(Teich) was 46 4 ml   MM measurements:  Unspecified Anatomy:   TAPSE was 2 6 cm  PW measurements:  Unspecified Anatomy:   MV A Michael was 0 6 m/s  MV Dec Tripp was 5 2 m/s2  MV DecT was 142 3 ms   MV E Michael was 0 7 m/s  MV E/A Ratio was 1 3   MV PHT was 41 3 ms  MVA By PHT was 5 3 cm2  HISTORY: PRIOR HISTORY: Body mass index (BMI) 38 0-38 9  Family history of cardiomyopathy and heart failure (father)  Patient has no history of cardiovascular disease  PROCEDURE: The study was performed in the [place] Echo 3  This was a routine study  The transthoracic approach was used  The study included complete 2D imaging, M-mode, complete spectral Doppler, and color Doppler  The heart rate was 70 bpm, at  the start of the study  Images were obtained from the parasternal, apical, subcostal, and suprasternal notch acoustic windows  Image quality was adequate      LEFT VENTRICLE: Normal left ventricular cavity size, severe asymmetric left ventricular hypertrophy of the interventricular septum, normal left ventricular systolic function, hyperdynamic wall motion  There is systolic anterior motion of  the anterior mitral valve leaflet without definite outflow tract obstruction  Ejection fraction is determined as around 65% or greater  Doppler evaluation is consistent with normal diastolic function  RIGHT VENTRICLE: Normal right ventricular size and systolic function  Right ventricular systolic pressure could not be estimated due to inadequate tricuspid regurgitation profile  LEFT ATRIUM: Normal left atrial cavity size  Slightly aneurysmal interatrial septum  No color-flow Doppler evidence of interatrial shunts  RIGHT ATRIUM: Normal right ventricular size and systolic function  Normal estimated right ventricular systolic pressure  MITRAL VALVE: Normal mitral valve leaflet structure and motion  No mitral valve stenosis or regurgitation  AORTIC VALVE: Tricuspid aortic valve with good cuspal separation  Trace aortic valve regurgitation  TRICUSPID VALVE: Normal tricuspid valve morphology and leaflet separation  Trace tricuspid valve regurgitation  PULMONIC VALVE: No significant pulmonic valve regurgitation  PERICARDIUM: No pericardial effusion  AORTA: Aortic root and proximal ascending aorta are normal in size on 2D imaging  SYSTEMIC VEINS: IVC: Inferior vena cava is normal in size and demonstrates appropriate respiratory phasic changes in diameter      SYSTEM MEASUREMENT TABLES    2D  %FS: 33 2 %  Ao Diam: 2 6 cm  EDV(Teich): 74 6 ml  EF(Teich): 62 2 %  ESV(Teich): 28 2 ml  HR_4Ch_Q: 69 2 BPM  IVSd: 1 8 cm  LA Diam: 3 6 cm  LAAs A4C: 15 6 cm2  LAESV A-L A4C: 48 2 ml  LAESV MOD A4C: 46 2 ml  LALs A4C: 4 3 cm  LVCO_4Ch_Q: 3 9 L/min  LVEF_4Ch_Q: 64 6 %  LVESV MOD A4C: 21 3 ml  LVIDd: 4 1 cm  LVIDs: 2 7 cm  LVLd_4Ch_Q: 8 7 cm  LVLs A4C: 6 7 cm  LVLs_4Ch_Q: 7 1 cm  LVPWd: 0 9 cm  LVSV_4Ch_Q: 56 8 ml  LVVED_4Ch_Q: 87 9 ml  LVVES_4Ch_Q: 31 1 ml  RAAs: 9 1 cm2  RAESV A-L: 18 7 ml  RAESV MOD: 18 1 ml  RALs: 3 8 cm  RVIDd: 2 4 cm  SV(Teich): 46 4 ml    MM  TAPSE: 2 6 cm    PW  MV A Michael: 0 6 m/s  MV Dec [place]: 5 2 m/s2  MV DecT: 142 3 ms  MV E Michael: 0 7 m/s  MV E/A Ratio: 1 3  MV PHT: 41 3 ms  MVA By PHT: 5 3 cm2    [facility] Accredited [facility]ocardi[facility]    Prepared and electronically signed by    Monica Hall MD  Signed 22-May-2020 14:35:21       CARDIAC MRI 06/29/2020  Study Result     22year old woman for evaluation for hypertrophic cardiomyopathy      Technique:  1  3 plane SSFP localizers  2  SSFP cine imaging in long and short axis plane  3  T2 weighted DIR FSE in short axis plane  4  19 ml gadobutrol power injected  5  2D inversion recovery FGRE for delayed myocardial enhancement  6  The patient tolerated the procedure well without complication      Measurements:   Duncan-septal wall 20 mm  Postero-lateral wall 10 mm  Left ventricular end-diastolic dimension 55 mm  Left ventricular end-systolic dimension 31 mm  Left ventricular end-diastolic volume 450 ml  Left ventricular end-systolic volume  56 ml  Stroke volume 83 ml  Cardiac Output 3 6 L/min  Ejection fraction 60 %  Left atrium 37 mm  Aortic Root 22 mm     Findings:    1  The left ventricle is normal in size with severe hypertrophy of the basal to distal anterior and anteroseptal wall  Left ventricular systolic function is normal with a measured ejection fraction of 60%  There are no regional left ventricular wall   motion abnormalities  2  The right ventricle is normal in size with normal right ventricular systolic function  3  The aortic, mitral, and tricuspid valves open without restriction  There is no significant valvular regurgitation, however cine MRI is inaccurate in the qualitative assessment of valvular regurgitation  4  The left atrium is normal in size  The aortic root is normal in size  5  There is no evidence of myocardial edema  6  Delayed post-gadolinium imaging demonstrates no region of hyperenhancement      IMPRESSION:  Impression:  1   Severe hypertrophy of the basal to distal anterior and anteroseptal wall  Normal left ventricular systolic function  2  Normal right ventricular size and systolic function  3  No significant valvular abnormalities  4  No evidence of myocardial scarring, inflammation, or infiltrative disease  5  These findings are suggestive of asymmetric hypertrophic cardiomyopathy          Workstation performed: [id number]         ======================================================    Lab Results   Component Value Date    WBC 10 16 09/11/2020    HGB 12 7 09/11/2020    HCT 40 7 09/11/2020    MCV 93 09/11/2020     09/11/2020      Lab Results   Component Value Date    SODIUM 133 (L) 09/11/2020    K 3 6 09/11/2020     09/11/2020    CO2 27 09/11/2020    BUN 10 09/11/2020    CREATININE 0 74 09/11/2020    GLUC 92 09/11/2020    CALCIUM 8 6 09/11/2020          Imaging:   I have personally reviewed pertinent reports  Portions of the record may have been created with voice recognition software  Occasional wrong word or "sound a like" substitutions may have occurred due to the inherent limitations of voice recognition software  Read the chart carefully and recognize, using context, where substitutions have occurred

## 2020-09-28 NOTE — LETTER
September 28, 2020     Damon Wolff Adolphus [address]    Patient: Joanne Martinez   YOB: 1995   Date of Visit: 9/28/2020       Dear Dr Sarthak Greer:    Thank you for referring Joanne Martinez to me for evaluation  Below are my notes for this consultation  If you have questions, please do not hesitate to call me  I look forward to following your patient along with you  Sincerely,        Earnest Retana MD        CC: No Recipients  Earnest Retana MD  9/28/2020 10:24 AM  Incomplete                                             Cardiology Follow Up    Joanne Martinez  1995  [id number]  [address]  [address] [facility] [address] [phone]    1  Asymmetrical septal hypertrophic cardiomyopathy [facility])  Ambulatory referral to Cardiology   2  Palpitations     3  Dyspnea on exertion     4  Class 1 obesity due to excess calories without serious comorbidity with body mass index (BMI) of 33 0 to 33 9 in adult         No specialty comments available  Interval History:  Patient complains of feeling lightheaded and dizzy frequently  She has fainted once in the past but not recently  She has occasional palpitations but they do not represent a problem  On an extended ambulatory Holter monitor, she had no significant arrhythmias  She pressed the event button several times but had sinus rhythm at the time  She denies chest pain or shortness of breath  Her father also has a cardiomyopathy and a defibrillator in place  Normal cared for by Dr Junella Sandifer at [facility]     Discussion/Summary:   1  Referral to Dr Sarthak Greer for further evaluation and recommendations  2  Return in 6 months although if Dr Sarthak Greer would prefer to follow the patient, I recommended that the patient call and cancel her visit with me      Patient Active Problem List   Diagnosis    Asymmetrical septal hypertrophic cardiomyopathy ([address] )    Class 1 obesity due to excess calories without serious comorbidity with body mass index (BMI) of 33 0 to 33 9 in adult    Palpitations    Dyspnea on exertion     Past Medical History:   Diagnosis Date    Disease of thyroid gland      Social History     Socioeconomic History    Marital status: Single     Spouse name: Not on file    Number of children: Not on file    Years of education: Not on file    Highest education level: Not on file   Occupational History    Not on file   Social Needs    Financial resource strain: Not hard at all    Food insecurity     Worry: Never true     Inability: Never true   [employer] needs     Medical: No     Non-medical: No   Tobacco Use    Smoking status: Never Smoker    Smokeless tobacco: Never Used   Substance and Sexual Activity    Alcohol use: Yes     Drinks per session: 1 or 2     Binge frequency: Never    Drug use: Not Currently    Sexual activity: Not Currently   Lifestyle    Physical activity     Days per week: Patient refused     Minutes per session: Patient refused    Stress: Not at all   Relationships    Social connections     Talks on phone: Not on file     Gets together: Not on file     Attends Yazidi service: Not on file     Active member of club or organization: Not on file     Attends meetings of clubs or organizations: Not on file     Relationship status: Not on file    Intimate partner violence     Fear of current or ex partner: No     Emotionally abused: No     Physically abused: No     Forced sexual activity: No   Other Topics Concern    Not on file   Social History Narrative    Not on file      Family History   Problem Relation Age of Onset    No Known Problems Mother     No Known Problems Father      History reviewed  No pertinent surgical history  No current outpatient medications on file  Allergies   Allergen Reactions    Bee Venom Swelling       No results found for this visit on 09/28/20        Review of Systems:  Review of Systems Respiratory: Negative for cough, choking, chest tightness, shortness of breath and wheezing  Cardiovascular: Negative for chest pain, palpitations and leg swelling  Musculoskeletal: Negative for gait problem  Skin: Negative for rash  Neurological: Positive for dizziness and light-headedness  Negative for tremors, syncope, weakness, numbness and headaches  Psychiatric/Behavioral: Negative for agitation and behavioral problems  The patient is not hyperactive  Physical Exam:  /68   Pulse 81   Temp 97 5 °F (36 4 °C)   Ht 5' 4" (1 626 m)   Wt 89 5 kg (197 lb 6 4 oz)   BMI 33 88 kg/m²   Physical Exam  Constitutional:       General: She is not in acute distress  Appearance: She is well-developed  HENT:      Head: Normocephalic and atraumatic  Neck:      Thyroid: No thyromegaly  Vascular: No JVD  Cardiovascular:      Rate and Rhythm: Normal rate and regular rhythm  Heart sounds: Normal heart sounds  No murmur  No friction rub  No gallop  Comments: No murmur with Valsalva maneuver  Pulmonary:      Effort: No respiratory distress  Breath sounds: No wheezing or rales  Musculoskeletal:         General: No swelling  Right lower leg: No edema  Left lower leg: No edema  Skin:     General: Skin is warm and dry  Neurological:      Mental Status: She is alert and oriented to person, place, and time  Psychiatric:         Behavior: Behavior normal            --------------------------------------------------------------------------------  ECHO:   Results for orders placed during the hospital encounter of 20   Echo complete with contrast if indicated    Narrative [address]    [facility] [place], [address] [facility]  [phone]    Transthoracic Echocardiogram  2D, M-mode, Doppler, and Color Doppler    Study date:  22-May-2020    Patient: Rosanna Alcala  MR number: [id number]  Account number: [de-identified]  : 1995  Age: 22 years  Gender: Female  Status: Outpatient  Location: [facility] Echo 3  Height: 64 in  Weight: 220 4 lb  BP: 128/ 74 mmHg    Indications: Fatigue  Diagnoses: R53 83 - Other fatigue    Sonographer:  Celina Bell RDCS  Primary Physician:  Marek Poole MD  Referring Physician:  Marek Poole MD  Group[facility]  [facility]  Interpreting Physician:  Sweta Thornton MD    IMPRESSIONS:  Technical quality: Good  Cardiac rhythm: Normal sinus    1  Severe asymmetric hypertrophy of interventricular septum with dynamic increase in flow velocities without definite evidence of outflow tract obstruction  Normal diastolic function  Ejection fraction estimated as around 65%  2  Normal right ventricular size and systolic function  3  Normal left and right atrial size  4  Trace aortic valve regurgitation  5  Trace mitral and tricuspid valve regurgitation  6  No obvious pulmonary hypertension  7  No pericardial effusion  No previous echocardiogram available for comparison  Consider cardiac MR for evaluation for asymmetric hypertrophic cardiomyopathy  SUMMARY    LEFT VENTRICLE:  Normal left ventricular cavity size, severe asymmetric left ventricular hypertrophy of the interventricular septum, normal left ventricular systolic function, hyperdynamic wall motion  There is systolic anterior motion of the anterior  mitral valve leaflet without definite outflow tract obstruction  Ejection fraction is determined as around 65% or greater  Doppler evaluation is consistent with normal diastolic function  RIGHT VENTRICLE:  Normal right ventricular size and systolic function  Right ventricular systolic pressure could not be estimated due to inadequate tricuspid regurgitation profile  LEFT ATRIUM:  Normal left atrial cavity size  Slightly aneurysmal interatrial septum  No color-flow Doppler evidence of interatrial shunts  RIGHT ATRIUM:  Normal right ventricular size and systolic function  Normal estimated right ventricular systolic pressure  MITRAL VALVE:  Normal mitral valve leaflet structure and motion  No mitral valve stenosis or regurgitation  AORTIC VALVE:  Tricuspid aortic valve with good cuspal separation  Trace aortic valve regurgitation  TRICUSPID VALVE:  Normal tricuspid valve morphology and leaflet separation  Trace tricuspid valve regurgitation  PULMONIC VALVE:  No significant pulmonic valve regurgitation  AORTA:  Aortic root and proximal ascending aorta are normal in size on 2D imaging  IVC, HEPATIC VEINS:  Inferior vena cava is normal in size and demonstrates appropriate respiratory phasic changes in diameter  PERICARDIUM:  No pericardial effusion  SUMMARY MEASUREMENTS  2D measurements:  Unspecified Anatomy:   %FS was 33 2 %  Ao Diam was 2 6 cm   EDV(Teich) was 74 6 ml   EF(Teich) was 62 2 %  ESV(Teich) was 28 2 ml   HR_4Ch_Q was 69 2 BPM   IVSd was 1 8 cm  LA Diam was 3 6 cm  LAAs A4C was 15 6 cm2  LAESV A-L A4C was  48 2 ml  LAESV MOD A4C was 46 2 ml  LALs A4C was 4 3 cm  LVCO_4Ch_Q was 3 9 L/min  LVEF_4Ch_Q was 64 6 %  LVESV MOD A4C was 21 3 ml  LVIDd was 4 1 cm  LVIDs was 2 7 cm  LVLd_4Ch_Q was 8 7 cm  LVLs A4C was 6 7 cm  LVLs_4Ch_Q was  7 1 cm  LVPWd was 0 9 cm  LVSV_4Ch_Q was 56 8 ml  LVVED_4Ch_Q was 87 9 ml   LVVES_4Ch_Q was 31 1 ml  RAAs was 9 1 cm2  RAESV A-L was 18 7 ml   RAESV MOD was 18 1 ml  RALs was 3 8 cm  RVIDd was 2 4 cm   SV(Teich) was 46 4 ml   MM measurements:  Unspecified Anatomy:   TAPSE was 2 6 cm  PW measurements:  Unspecified Anatomy:   MV A Michael was 0 6 m/s  MV Dec [place] was 5 2 m/s2  MV DecT was 142 3 ms   MV E Michael was 0 7 m/s  MV E/A Ratio was 1 3   MV PHT was 41 3 ms  MVA By PHT was 5 3 cm2  HISTORY: PRIOR HISTORY: Body mass index (BMI) 38 0-38 9  Family history of cardiomyopathy and heart failure (father)  Patient has no history of cardiovascular disease      PROCEDURE: The study was performed in the [place] Echo 3  This was a routine study  The transthoracic approach was used  The study included complete 2D imaging, M-mode, complete spectral Doppler, and color Doppler  The heart rate was 70 bpm, at  the start of the study  Images were obtained from the parasternal, apical, subcostal, and suprasternal notch acoustic windows  Image quality was adequate  LEFT VENTRICLE: Normal left ventricular cavity size, severe asymmetric left ventricular hypertrophy of the interventricular septum, normal left ventricular systolic function, hyperdynamic wall motion  There is systolic anterior motion of  the anterior mitral valve leaflet without definite outflow tract obstruction  Ejection fraction is determined as around 65% or greater  Doppler evaluation is consistent with normal diastolic function  RIGHT VENTRICLE: Normal right ventricular size and systolic function  Right ventricular systolic pressure could not be estimated due to inadequate tricuspid regurgitation profile  LEFT ATRIUM: Normal left atrial cavity size  Slightly aneurysmal interatrial septum  No color-flow Doppler evidence of interatrial shunts  RIGHT ATRIUM: Normal right ventricular size and systolic function  Normal estimated right ventricular systolic pressure  MITRAL VALVE: Normal mitral valve leaflet structure and motion  No mitral valve stenosis or regurgitation  AORTIC VALVE: Tricuspid aortic valve with good cuspal separation  Trace aortic valve regurgitation  TRICUSPID VALVE: Normal tricuspid valve morphology and leaflet separation  Trace tricuspid valve regurgitation  PULMONIC VALVE: No significant pulmonic valve regurgitation  PERICARDIUM: No pericardial effusion  AORTA: Aortic root and proximal ascending aorta are normal in size on 2D imaging  SYSTEMIC VEINS: IVC: Inferior vena cava is normal in size and demonstrates appropriate respiratory phasic changes in diameter      SYSTEM MEASUREMENT TABLES    2D  %FS: 33 2 %  Ao Diam: 2 6 cm  EDV(Teich): 74 6 ml  EF(Teich): 62 2 %  ESV(Teich): 28 2 ml  HR_4Ch_Q: 69 2 BPM  IVSd: 1 8 cm  LA Diam: 3 6 cm  LAAs A4C: 15 6 cm2  LAESV A-L A4C: 48 2 ml  LAESV MOD A4C: 46 2 ml  LALs A4C: 4 3 cm  LVCO_4Ch_Q: 3 9 L/min  LVEF_4Ch_Q: 64 6 %  LVESV MOD A4C: 21 3 ml  LVIDd: 4 1 cm  LVIDs: 2 7 cm  LVLd_4Ch_Q: 8 7 cm  LVLs A4C: 6 7 cm  LVLs_4Ch_Q: 7 1 cm  LVPWd: 0 9 cm  LVSV_4Ch_Q: 56 8 ml  LVVED_4Ch_Q: 87 9 ml  LVVES_4Ch_Q: 31 1 ml  RAAs: 9 1 cm2  RAESV A-L: 18 7 ml  RAESV MOD: 18 1 ml  RALs: 3 8 cm  RVIDd: 2 4 cm  SV(Teich): 46 4 ml    MM  TAPSE: 2 6 cm    PW  MV A Michael: 0 6 m/s  MV Dec [place]: 5 2 m/s2  MV DecT: 142 3 ms  MV E Michael: 0 7 m/s  MV E/A Ratio: 1 3  MV PHT: 41 3 ms  MVA By PHT: 5 3 cm2    [facility] Accredited Echocardiography Laboratory    Prepared and electronically signed by    Jeffrey Qiu MD  Signed 22-May-2020 14:35:21       CARDIAC MRI 06/29/2020  Study Result     22year old woman for evaluation for hypertrophic cardiomyopathy      Technique:  1  3 plane SSFP localizers  2  SSFP cine imaging in long and short axis plane  3  T2 weighted DIR FSE in short axis plane  4  19 ml gadobutrol power injected  5  2D inversion recovery FGRE for delayed myocardial enhancement  6  The patient tolerated the procedure well without complication      Measurements:   Duncan-septal wall 20 mm  Postero-lateral wall 10 mm  Left ventricular end-diastolic dimension 55 mm  Left ventricular end-systolic dimension 31 mm  Left ventricular end-diastolic volume 579 ml  Left ventricular end-systolic volume  56 ml  Stroke volume 83 ml  Cardiac Output 3 6 L/min  Ejection fraction 60 %  Left atrium 37 mm  Aortic Root 22 mm     Findings:    1  The left ventricle is normal in size with severe hypertrophy of the basal to distal anterior and anteroseptal wall  Left ventricular systolic function is normal with a measured ejection fraction of 60%   There are no regional left ventricular wall   motion abnormalities  2  The right ventricle is normal in size with normal right ventricular systolic function  3  The aortic, mitral, and tricuspid valves open without restriction  There is no significant valvular regurgitation, however cine MRI is inaccurate in the qualitative assessment of valvular regurgitation  4  The left atrium is normal in size  The aortic root is normal in size  5  There is no evidence of myocardial edema  6  Delayed post-gadolinium imaging demonstrates no region of hyperenhancement      IMPRESSION:  Impression:  1  Severe hypertrophy of the basal to distal anterior and anteroseptal wall  Normal left ventricular systolic function  2  Normal right ventricular size and systolic function  3  No significant valvular abnormalities  4  No evidence of myocardial scarring, inflammation, or infiltrative disease  5  These findings are suggestive of asymmetric hypertrophic cardiomyopathy          Workstation performed: [id number]         ======================================================    Lab Results   Component Value Date    WBC 10 16 09/11/2020    HGB 12 7 09/11/2020    HCT 40 7 09/11/2020    MCV 93 09/11/2020     09/11/2020      Lab Results   Component Value Date    SODIUM 133 (L) 09/11/2020    K 3 6 09/11/2020     09/11/2020    CO2 27 09/11/2020    BUN 10 09/11/2020    CREATININE 0 74 09/11/2020    GLUC 92 09/11/2020    CALCIUM 8 6 09/11/2020          Imaging:   I have personally reviewed pertinent reports  Portions of the record may have been created with voice recognition software  Occasional wrong word or "sound a like" substitutions may have occurred due to the inherent limitations of voice recognition software  Read the chart carefully and recognize, using context, where substitutions have occurred      Brinda Zambrano MD  9/28/2020 10:23 AM  Sign when Signing Visit                                             Cardiology Follow Up    John perez Gutierrez Allen  1995  [id number]  [address] [place] [address] [phone]    1  Asymmetrical septal hypertrophic cardiomyopathy [facility])  Ambulatory referral to Cardiology   2  Palpitations     3  Dyspnea on exertion     4  Class 1 obesity due to excess calories without serious comorbidity with body mass index (BMI) of 33 0 to 33 9 in adult         No specialty comments available  Interval History:  Patient complains of feeling lightheaded and dizzy frequently  She has fainted once in the past but not recently  She has occasional palpitations but they do not represent a problem  On an extended ambulatory Holter monitor, she had no significant arrhythmias  She pressed the event button several times but had sinus rhythm at the time  She denies chest pain or shortness of breath  Her father also has a cardiomyopathy and a defibrillator in place  Normal cared for by Dr Herberth Lopez at [facility]     Discussion/Summary:   1  Referral to Dr Ramon Powell for further evaluation and recommendations  2  Return in 6 months although if Dr Ramon Powell would prefer to follow the patient, I recommended that the patient call and cancel her visit with me      Patient Active Problem List   Diagnosis    Asymmetrical septal hypertrophic cardiomyopathy (HCC)    Class 1 obesity due to excess calories without serious comorbidity with body mass index (BMI) of 33 0 to 33 9 in adult    Palpitations    Dyspnea on exertion     Past Medical History:   Diagnosis Date    Disease of thyroid gland      Social History     Socioeconomic History    Marital status: Single     Spouse name: Not on file    Number of children: Not on file    Years of education: Not on file    Highest education level: Not on file   Occupational History    Not on file   Social Needs    Financial resource strain: Not hard at all    Food insecurity     Worry: Never true     Inability: Never true   [employer] needs     Medical: No     Non-medical: No   Tobacco Use    Smoking status: Never Smoker    Smokeless tobacco: Never Used   Substance and Sexual Activity    Alcohol use: Yes     Drinks per session: 1 or 2     Binge frequency: Never    Drug use: Not Currently    Sexual activity: Not Currently   Lifestyle    Physical activity     Days per week: Patient refused     Minutes per session: Patient refused    Stress: Not at all   Relationships    Social connections     Talks on phone: Not on file     Gets together: Not on file     Attends Restorationist service: Not on file     Active member of club or organization: Not on file     Attends meetings of clubs or organizations: Not on file     Relationship status: Not on file    Intimate partner violence     Fear of current or ex partner: No     Emotionally abused: No     Physically abused: No     Forced sexual activity: No   Other Topics Concern    Not on file   Social History Narrative    Not on file      Family History   Problem Relation Age of Onset    No Known Problems Mother     No Known Problems Father      History reviewed  No pertinent surgical history  No current outpatient medications on file  Allergies   Allergen Reactions    Bee Venom Swelling       No results found for this visit on 09/28/20  Review of Systems:  Review of Systems   Respiratory: Negative for cough, choking, chest tightness, shortness of breath and wheezing  Cardiovascular: Negative for chest pain, palpitations and leg swelling  Musculoskeletal: Negative for gait problem  Skin: Negative for rash  Neurological: Positive for dizziness and light-headedness  Negative for tremors, syncope, weakness, numbness and headaches  Psychiatric/Behavioral: Negative for agitation and behavioral problems  The patient is not hyperactive          Physical Exam:  /68   Pulse 81   Temp 97 5 °F (36 4 °C)   Ht 5' 4" (1 626 m)   Wt 89 5 kg (197 lb 6 4 oz)   BMI 33 88 kg/m²   Physical Exam  Constitutional:       General: She is not in acute distress  Appearance: She is well-developed  HENT:      Head: Normocephalic and atraumatic  Neck:      Thyroid: No thyromegaly  Vascular: No JVD  Cardiovascular:      Rate and Rhythm: Normal rate and regular rhythm  Heart sounds: Normal heart sounds  No murmur  No friction rub  No gallop  Comments: No murmur with Valsalva maneuver  Pulmonary:      Effort: No respiratory distress  Breath sounds: No wheezing or rales  Musculoskeletal:         General: No swelling  Right lower leg: No edema  Left lower leg: No edema  Skin:     General: Skin is warm and dry  Neurological:      Mental Status: She is alert and oriented to person, place, and time  Psychiatric:         Behavior: Behavior normal            --------------------------------------------------------------------------------  ECHO:   Results for orders placed during the hospital encounter of 20   Echo complete with contrast if indicated    Narrative [address]  [facility] [address]  \[facility] [place] [facility]\"", [address] [facility]  [phone]    Transthoracic Echocardiogram  2D, M-mode, Doppler, and Color Doppler    Study date:  22-May-2020    Patient: Rene Anand  MR number: [id number]  Account number: [de-identified]  : 1995  Age: 22 years  Gender: Female  Status: Outpatient  Location: [facility] Echo 3  Height: 64 in  Weight: 220 4 lb  BP: 128/ 74 mmHg    Indications: Fatigue  Diagnoses: R53 83 - Other fatigue    Sonographer:  Velasquez Dorman RDCS  Primary Physician:  Celso Keating MD  Referring Physician:  Celso Keating MD  Group:  [facility]  Interpreting Physician:  Janett Monge MD    IMPRESSIONS:  Technical quality: Good  Cardiac rhythm: Normal sinus    1   Severe asymmetric hypertrophy of interventricular septum with dynamic increase in flow velocities without definite evidence of outflow tract obstruction  Normal diastolic function  Ejection fraction estimated as around 65%  2  Normal right ventricular size and systolic function  3  Normal left and right atrial size  4  Trace aortic valve regurgitation  5  Trace mitral and tricuspid valve regurgitation  6  No obvious pulmonary hypertension  7  No pericardial effusion  No previous echocardiogram available for comparison  Consider cardiac MR for evaluation for asymmetric hypertrophic cardiomyopathy  SUMMARY    LEFT VENTRICLE:  Normal left ventricular cavity size, severe asymmetric left ventricular hypertrophy of the interventricular septum, normal left ventricular systolic function, hyperdynamic wall motion  There is systolic anterior motion of the anterior  mitral valve leaflet without definite outflow tract obstruction  Ejection fraction is determined as around 65% or greater  Doppler evaluation is consistent with normal diastolic function  RIGHT VENTRICLE:  Normal right ventricular size and systolic function  Right ventricular systolic pressure could not be estimated due to inadequate tricuspid regurgitation profile  LEFT ATRIUM:  Normal left atrial cavity size  Slightly aneurysmal interatrial septum  No color-flow Doppler evidence of interatrial shunts  RIGHT ATRIUM:  Normal right ventricular size and systolic function  Normal estimated right ventricular systolic pressure  MITRAL VALVE:  Normal mitral valve leaflet structure and motion  No mitral valve stenosis or regurgitation  AORTIC VALVE:  Tricuspid aortic valve with good cuspal separation  Trace aortic valve regurgitation  TRICUSPID VALVE:  Normal tricuspid valve morphology and leaflet separation  Trace tricuspid valve regurgitation  PULMONIC VALVE:  No significant pulmonic valve regurgitation  AORTA:  Aortic root and proximal ascending aorta are normal in size on 2D imaging      IVC, HEPATIC VEINS:  Inferior vena cava is normal in size and demonstrates appropriate respiratory phasic changes in diameter  PERICARDIUM:  No pericardial effusion  SUMMARY MEASUREMENTS  2D measurements:  Unspecified Anatomy:   %FS was 33 2 %  Ao Diam was 2 6 cm   EDV(Teich) was 74 6 ml   EF(Teich) was 62 2 %  ESV(Teich) was 28 2 ml   HR_4Ch_Q was 69 2 BPM   IVSd was 1 8 cm  LA Diam was 3 6 cm  LAAs A4C was 15 6 cm2  LAESV A-L A4C was  48 2 ml  LAESV MOD A4C was 46 2 ml  LALs A4C was 4 3 cm  LVCO_4Ch_Q was 3 9 L/min  LVEF_4Ch_Q was 64 6 %  LVESV MOD A4C was 21 3 ml  LVIDd was 4 1 cm  LVIDs was 2 7 cm  LVLd_4Ch_Q was 8 7 cm  LVLs A4C was 6 7 cm  LVLs_4Ch_Q was  7 1 cm  LVPWd was 0 9 cm  LVSV_4Ch_Q was 56 8 ml  LVVED_4Ch_Q was 87 9 ml   LVVES_4Ch_Q was 31 1 ml  RAAs was 9 1 cm2  RAESV A-L was 18 7 ml   RAESV MOD was 18 1 ml  RALs was 3 8 cm  RVIDd was 2 4 cm   SV(Teich) was 46 4 ml   MM measurements:  Unspecified Anatomy:   TAPSE was 2 6 cm  PW measurements:  Unspecified Anatomy:   MV A Michael was 0 6 m/s  MV Dec [place] was 5 2 m/s2  MV DecT was 142 3 ms   MV E Michael was 0 7 m/s  MV E/A Ratio was 1 3   MV PHT was 41 3 ms  MVA By PHT was 5 3 cm2  HISTORY: PRIOR HISTORY: Body mass index (BMI) 38 0-38 9  Family history of cardiomyopathy and heart failure (father)  Patient has no history of cardiovascular disease  PROCEDURE: The study was performed in the [facility] Echo 3  This was a routine study  The transthoracic approach was used  The study included complete 2D imaging, M-mode, complete spectral Doppler, and color Doppler  The heart rate was 70 bpm, at  the start of the study  Images were obtained from the parasternal, apical, subcostal, and suprasternal notch acoustic windows  Image quality was adequate  LEFT VENTRICLE: Normal left ventricular cavity size, severe asymmetric left ventricular hypertrophy of the interventricular septum, normal left ventricular systolic function, hyperdynamic wall motion   There is systolic anterior motion of  the anterior mitral valve leaflet without definite outflow tract obstruction  Ejection fraction is determined as around 65% or greater  Doppler evaluation is consistent with normal diastolic function  RIGHT VENTRICLE: Normal right ventricular size and systolic function  Right ventricular systolic pressure could not be estimated due to inadequate tricuspid regurgitation profile  LEFT ATRIUM: Normal left atrial cavity size  Slightly aneurysmal interatrial septum  No color-flow Doppler evidence of interatrial shunts  RIGHT ATRIUM: Normal right ventricular size and systolic function  Normal estimated right ventricular systolic pressure  MITRAL VALVE: Normal mitral valve leaflet structure and motion  No mitral valve stenosis or regurgitation  AORTIC VALVE: Tricuspid aortic valve with good cuspal separation  Trace aortic valve regurgitation  TRICUSPID VALVE: Normal tricuspid valve morphology and leaflet separation  Trace tricuspid valve regurgitation  PULMONIC VALVE: No significant pulmonic valve regurgitation  PERICARDIUM: No pericardial effusion  AORTA: Aortic root and proximal ascending aorta are normal in size on 2D imaging  SYSTEMIC VEINS: IVC: Inferior vena cava is normal in size and demonstrates appropriate respiratory phasic changes in diameter      SYSTEM MEASUREMENT TABLES    2D  %FS: 33 2 %  Ao Diam: 2 6 cm  EDV(Teich): 74 6 ml  EF(Teich): 62 2 %  ESV(Teich): 28 2 ml  HR_4Ch_Q: 69 2 BPM  IVSd: 1 8 cm  LA Diam: 3 6 cm  LAAs A4C: 15 6 cm2  LAESV A-L A4C: 48 2 ml  LAESV MOD A4C: 46 2 ml  LALs A4C: 4 3 cm  LVCO_4Ch_Q: 3 9 L/min  LVEF_4Ch_Q: 64 6 %  LVESV MOD A4C: 21 3 ml  LVIDd: 4 1 cm  LVIDs: 2 7 cm  LVLd_4Ch_Q: 8 7 cm  LVLs A4C: 6 7 cm  LVLs_4Ch_Q: 7 1 cm  LVPWd: 0 9 cm  LVSV_4Ch_Q: 56 8 ml  LVVED_4Ch_Q: 87 9 ml  LVVES_4Ch_Q: 31 1 ml  RAAs: 9 1 cm2  RAESV A-L: 18 7 ml  RAESV MOD: 18 1 ml  RALs: 3 8 cm  RVIDd: 2 4 cm  SV(Teich): 46 4 ml    MM  TAPSE: 2 6 cm    PW  MV A Michael: 0 6 m/s  MV Dec [place]: 5 2 m/s2  MV DecT: 142 3 ms  MV E Michael: 0 7 m/s  MV E/A Ratio: 1 3  MV PHT: 41 3 ms  MVA By PHT: 5 3 cm2    [facility] Accredited [facility] Laboratory    Prepared and electronically signed by    Rogers Adair MD  Signed 22-May-2020 14:35:21       CARDIAC MRI 06/29/2020  Study Result     22year old woman for evaluation for hypertrophic cardiomyopathy      Technique:  1  3 plane SSFP localizers  2  SSFP cine imaging in long and short axis plane  3  T2 weighted DIR FSE in short axis plane  4  19 ml gadobutrol power injected  5  2D inversion recovery FGRE for delayed myocardial enhancement  6  The patient tolerated the procedure well without complication      Measurements:   Duncan-septal wall 20 mm  Postero-lateral wall 10 mm  Left ventricular end-diastolic dimension 55 mm  Left ventricular end-systolic dimension 31 mm  Left ventricular end-diastolic volume 627 ml  Left ventricular end-systolic volume  56 ml  Stroke volume 83 ml  Cardiac Output 3 6 L/min  Ejection fraction 60 %  Left atrium 37 mm  Aortic Root 22 mm     Findings:    1  The left ventricle is normal in size with severe hypertrophy of the basal to distal anterior and anteroseptal wall  Left ventricular systolic function is normal with a measured ejection fraction of 60%  There are no regional left ventricular wall   motion abnormalities  2  The right ventricle is normal in size with normal right ventricular systolic function  3  The aortic, mitral, and tricuspid valves open without restriction  There is no significant valvular regurgitation, however cine MRI is inaccurate in the qualitative assessment of valvular regurgitation  4  The left atrium is normal in size  The aortic root is normal in size  5  There is no evidence of myocardial edema  6  Delayed post-gadolinium imaging demonstrates no region of hyperenhancement      IMPRESSION:  Impression:  1  Severe hypertrophy of the basal to distal anterior and anteroseptal wall  Normal left ventricular systolic function  2  Normal right ventricular size and systolic function  3  No significant valvular abnormalities  4  No evidence of myocardial scarring, inflammation, or infiltrative disease  5  These findings are suggestive of asymmetric hypertrophic cardiomyopathy          Workstation performed: [id number]         ======================================================    Lab Results   Component Value Date    WBC 10 16 09/11/2020    HGB 12 7 09/11/2020    HCT 40 7 09/11/2020    MCV 93 09/11/2020     09/11/2020      Lab Results   Component Value Date    SODIUM 133 (L) 09/11/2020    K 3 6 09/11/2020     09/11/2020    CO2 27 09/11/2020    BUN 10 09/11/2020    CREATININE 0 74 09/11/2020    GLUC 92 09/11/2020    CALCIUM 8 6 09/11/2020      No results found for: HGBA1C   No results found for: CHOL  No results found for: HDL  No results found for: LDLCALC  No results found for: TRIG  No results found for: CHOLHDL   No results found for: INR, PROTIME     Imaging:   {Results Review Statement:47419}      Portions of the record may have been created with voice recognition software  Occasional wrong word or "sound a like" substitutions may have occurred due to the inherent limitations of voice recognition software  Read the chart carefully and recognize, using context, where substitutions have occurred

## 2020-09-28 NOTE — LETTER
September 28, 2020     Damon Jhaveri [address]    Patient: Silvino Hilton   YOB: 1995   Date of Visit: 9/28/2020       Dear Dr Derrick Bolden:    Thank you for referring Silvino Hilton to me for evaluation  Below are my notes for this consultation  If you have questions, please do not hesitate to call me  I look forward to following your patient along with you  Sincerely,        Sonya Grant MD        CC: No Recipients  Sonya Grant MD  9/28/2020 10:25 AM  Sign when Signing Visit                                             Cardiology Follow Up    Silvino Hilton  1995  [id number]  [address]  [address] [facility] [address] [phone]    1  Asymmetrical septal hypertrophic cardiomyopathy [facility])  Ambulatory referral to Cardiology   2  Palpitations     3  Dyspnea on exertion     4  Class 1 obesity due to excess calories without serious comorbidity with body mass index (BMI) of 33 0 to 33 9 in adult         No specialty comments available  Interval History:  Patient complains of feeling lightheaded and dizzy frequently  She has fainted once in the past but not recently  She has occasional palpitations but they do not represent a problem  On an extended ambulatory Holter monitor, she had no significant arrhythmias  She pressed the event button several times but had sinus rhythm at the time  She denies chest pain or shortness of breath  Her father also has a cardiomyopathy and a defibrillator in place  Normal cared for by Dr Judson Wall at [facility]     Discussion/Summary:   1  Referral to Dr Derrick Bolden for further evaluation and recommendations  2  Return in 6 months although if Dr Derrick Bolden would prefer to follow the patient, I recommended that the patient call and cancel her visit with me      Patient Active Problem List   Diagnosis    Asymmetrical septal hypertrophic cardiomyopathy ([facility] [address] )    Class 1 obesity due to excess calories without serious comorbidity with body mass index (BMI) of 33 0 to 33 9 in adult    Palpitations    Dyspnea on exertion     Past Medical History:   Diagnosis Date    Disease of thyroid gland      Social History     Socioeconomic History    Marital status: Single     Spouse name: Not on file    Number of children: Not on file    Years of education: Not on file    Highest education level: Not on file   Occupational History    Not on file   Social Needs    Financial resource strain: Not hard at all    Food insecurity     Worry: Never true     Inability: Never true   [employer] needs     Medical: No     Non-medical: No   Tobacco Use    Smoking status: Never Smoker    Smokeless tobacco: Never Used   Substance and Sexual Activity    Alcohol use: Yes     Drinks per session: 1 or 2     Binge frequency: Never    Drug use: Not Currently    Sexual activity: Not Currently   Lifestyle    Physical activity     Days per week: Patient refused     Minutes per session: Patient refused    Stress: Not at all   Relationships    Social connections     Talks on phone: Not on file     Gets together: Not on file     Attends Jainism service: Not on file     Active member of club or organization: Not on file     Attends meetings of clubs or organizations: Not on file     Relationship status: Not on file    Intimate partner violence     Fear of current or ex partner: No     Emotionally abused: No     Physically abused: No     Forced sexual activity: No   Other Topics Concern    Not on file   Social History Narrative    Not on file      Family History   Problem Relation Age of Onset    No Known Problems Mother     No Known Problems Father      History reviewed  No pertinent surgical history  No current outpatient medications on file  Allergies   Allergen Reactions    Bee Venom Swelling       No results found for this visit on 09/28/20        Review of Systems:  Review of Systems   Respiratory: Negative for cough, choking, chest tightness, shortness of breath and wheezing  Cardiovascular: Negative for chest pain, palpitations and leg swelling  Musculoskeletal: Negative for gait problem  Skin: Negative for rash  Neurological: Positive for dizziness and light-headedness  Negative for tremors, syncope, weakness, numbness and headaches  Psychiatric/Behavioral: Negative for agitation and behavioral problems  The patient is not hyperactive  Physical Exam:  /68   Pulse 81   Temp 97 5 °F (36 4 °C)   Ht 5' 4" (1 626 m)   Wt 89 5 kg (197 lb 6 4 oz)   BMI 33 88 kg/m²   Physical Exam  Constitutional:       General: She is not in acute distress  Appearance: She is well-developed  HENT:      Head: Normocephalic and atraumatic  Neck:      Thyroid: No thyromegaly  Vascular: No JVD  Cardiovascular:      Rate and Rhythm: Normal rate and regular rhythm  Heart sounds: Normal heart sounds  No murmur  No friction rub  No gallop  Comments: No murmur with Valsalva maneuver  Pulmonary:      Effort: No respiratory distress  Breath sounds: No wheezing or rales  Musculoskeletal:         General: No swelling  Right lower leg: No edema  Left lower leg: No edema  Skin:     General: Skin is warm and dry  Neurological:      Mental Status: She is alert and oriented to person, place, and time  Psychiatric:         Behavior: Behavior normal            --------------------------------------------------------------------------------  ECHO:   Results for orders placed during the hospital encounter of 20   Echo complete with contrast if indicated    Narrative [facility] [address], [address] [facility]  [phone]    Transthoracic Echocardiogram  2D, M-mode, Doppler, and Color Doppler    Study date:  22-May-2020    Patient: Matias Lee  MR number: [id number]  Account number: [de-identified]  : 1995  Age: 22 years  Gender: Female  Status: Outpatient  Location: [facility] Echo 3  Height: 64 in  Weight: 220 4 lb  BP: 128/ 74 mmHg    Indications: Fatigue  Diagnoses: R53 83 - Other fatigue    Sonographer:  Alivia Carlos RDCS  Primary Physician:  Chrisandra Bernheim, MD  Referring Physician:  Chrisandra Bernheim, MD  Group:  [facility]  Interpreting Physician:  Anshul Camejo MD    IMPRESSIONS:  Technical quality: Good  Cardiac rhythm: Normal sinus    1  Severe asymmetric hypertrophy of interventricular septum with dynamic increase in flow velocities without definite evidence of outflow tract obstruction  Normal diastolic function  Ejection fraction estimated as around 65%  2  Normal right ventricular size and systolic function  3  Normal left and right atrial size  4  Trace aortic valve regurgitation  5  Trace mitral and tricuspid valve regurgitation  6  No obvious pulmonary hypertension  7  No pericardial effusion  No previous echocardiogram available for comparison  Consider cardiac MR for evaluation for asymmetric hypertrophic cardiomyopathy  SUMMARY    LEFT VENTRICLE:  Normal left ventricular cavity size, severe asymmetric left ventricular hypertrophy of the interventricular septum, normal left ventricular systolic function, hyperdynamic wall motion  There is systolic anterior motion of the anterior  mitral valve leaflet without definite outflow tract obstruction  Ejection fraction is determined as around 65% or greater  Doppler evaluation is consistent with normal diastolic function  RIGHT VENTRICLE:  Normal right ventricular size and systolic function  Right ventricular systolic pressure could not be estimated due to inadequate tricuspid regurgitation profile  LEFT ATRIUM:  Normal left atrial cavity size  Slightly aneurysmal interatrial septum  No color-flow Doppler evidence of interatrial shunts  RIGHT ATRIUM:  Normal right ventricular size and systolic function  Normal estimated right ventricular systolic pressure  MITRAL VALVE:  Normal mitral valve leaflet structure and motion  No mitral valve stenosis or regurgitation  AORTIC VALVE:  Tricuspid aortic valve with good cuspal separation  Trace aortic valve regurgitation  TRICUSPID VALVE:  Normal tricuspid valve morphology and leaflet separation  Trace tricuspid valve regurgitation  PULMONIC VALVE:  No significant pulmonic valve regurgitation  AORTA:  Aortic root and proximal ascending aorta are normal in size on 2D imaging  IVC, HEPATIC VEINS:  Inferior vena cava is normal in size and demonstrates appropriate respiratory phasic changes in diameter  PERICARDIUM:  No pericardial effusion  SUMMARY MEASUREMENTS  2D measurements:  Unspecified Anatomy:   %FS was 33 2 %  Ao Diam was 2 6 cm   EDV(Teich) was 74 6 ml   EF(Teich) was 62 2 %  ESV(Teich) was 28 2 ml   HR_4Ch_Q was 69 2 BPM   IVSd was 1 8 cm  LA Diam was 3 6 cm  LAAs A4C was 15 6 cm2  LAESV A-L A4C was  48 2 ml  LAESV MOD A4C was 46 2 ml  LALs A4C was 4 3 cm  LVCO_4Ch_Q was 3 9 L/min  LVEF_4Ch_Q was 64 6 %  LVESV MOD A4C was 21 3 ml  LVIDd was 4 1 cm  LVIDs was 2 7 cm  LVLd_4Ch_Q was 8 7 cm  LVLs A4C was 6 7 cm  LVLs_4Ch_Q was  7 1 cm  LVPWd was 0 9 cm  LVSV_4Ch_Q was 56 8 ml  LVVED_4Ch_Q was 87 9 ml   LVVES_4Ch_Q was 31 1 ml  RAAs was 9 1 cm2  RAESV A-L was 18 7 ml   RAESV MOD was 18 1 ml  RALs was 3 8 cm  RVIDd was 2 4 cm   SV(Teich) was 46 4 ml   MM measurements:  Unspecified Anatomy:   TAPSE was 2 6 cm  PW measurements:  Unspecified Anatomy:   MV A Michael was 0 6 m/s  MV Dec [place] was 5 2 m/s2  MV DecT was 142 3 ms   MV E Michael was 0 7 m/s  MV E/A Ratio was 1 3   MV PHT was 41 3 ms  MVA By PHT was 5 3 cm2  HISTORY: PRIOR HISTORY: Body mass index (BMI) 38 0-38 9  Family history of cardiomyopathy and heart failure (father)  Patient has no history of cardiovascular disease      PROCEDURE: The study was performed in the [facility] Echo 3  This was a routine study  The transthoracic approach was used  The study included complete 2D imaging, M-mode, complete spectral Doppler, and color Doppler  The heart rate was 70 bpm, at  the start of the study  Images were obtained from the parasternal, apical, subcostal, and suprasternal notch acoustic windows  Image quality was adequate  LEFT VENTRICLE: Normal left ventricular cavity size, severe asymmetric left ventricular hypertrophy of the interventricular septum, normal left ventricular systolic function, hyperdynamic wall motion  There is systolic anterior motion of  the anterior mitral valve leaflet without definite outflow tract obstruction  Ejection fraction is determined as around 65% or greater  Doppler evaluation is consistent with normal diastolic function  RIGHT VENTRICLE: Normal right ventricular size and systolic function  Right ventricular systolic pressure could not be estimated due to inadequate tricuspid regurgitation profile  LEFT ATRIUM: Normal left atrial cavity size  Slightly aneurysmal interatrial septum  No color-flow Doppler evidence of interatrial shunts  RIGHT ATRIUM: Normal right ventricular size and systolic function  Normal estimated right ventricular systolic pressure  MITRAL VALVE: Normal mitral valve leaflet structure and motion  No mitral valve stenosis or regurgitation  AORTIC VALVE: Tricuspid aortic valve with good cuspal separation  Trace aortic valve regurgitation  TRICUSPID VALVE: Normal tricuspid valve morphology and leaflet separation  Trace tricuspid valve regurgitation  PULMONIC VALVE: No significant pulmonic valve regurgitation  PERICARDIUM: No pericardial effusion  AORTA: Aortic root and proximal ascending aorta are normal in size on 2D imaging  SYSTEMIC VEINS: IVC: Inferior vena cava is normal in size and demonstrates appropriate respiratory phasic changes in diameter      SYSTEM MEASUREMENT TABLES    2D  %FS: 33 2 %  Ao Diam: 2 6 cm  EDV(Teich): 74 6 ml  EF(Teich): 62 2 %  ESV(Teich): 28 2 ml  HR_4Ch_Q: 69 2 BPM  IVSd: 1 8 cm  LA Diam: 3 6 cm  LAAs A4C: 15 6 cm2  LAESV A-L A4C: 48 2 ml  LAESV MOD A4C: 46 2 ml  LALs A4C: 4 3 cm  LVCO_4Ch_Q: 3 9 L/min  LVEF_4Ch_Q: 64 6 %  LVESV MOD A4C: 21 3 ml  LVIDd: 4 1 cm  LVIDs: 2 7 cm  LVLd_4Ch_Q: 8 7 cm  LVLs A4C: 6 7 cm  LVLs_4Ch_Q: 7 1 cm  LVPWd: 0 9 cm  LVSV_4Ch_Q: 56 8 ml  LVVED_4Ch_Q: 87 9 ml  LVVES_4Ch_Q: 31 1 ml  RAAs: 9 1 cm2  RAESV A-L: 18 7 ml  RAESV MOD: 18 1 ml  RALs: 3 8 cm  RVIDd: 2 4 cm  SV(Teich): 46 4 ml    MM  TAPSE: 2 6 cm    PW  MV A Michael: 0 6 m/s  MV Dec [place]: 5 2 m/s2  MV DecT: 142 3 ms  MV E Michael: 0 7 m/s  MV E/A Ratio: 1 3  MV PHT: 41 3 ms  MVA By PHT: 5 3 cm2    [employer] Accredited Echocardiography Laboratory    Prepared and electronically signed by    Shari Suero MD  Signed 22-May-2020 14:35:21       CARDIAC MRI 06/29/2020  Study Result     22year old woman for evaluation for hypertrophic cardiomyopathy      Technique:  1  3 plane SSFP localizers  2  SSFP cine imaging in long and short axis plane  3  T2 weighted DIR FSE in short axis plane  4  19 ml gadobutrol power injected  5  2D inversion recovery FGRE for delayed myocardial enhancement  6  The patient tolerated the procedure well without complication      Measurements:   Duncan-septal wall 20 mm  Postero-lateral wall 10 mm  Left ventricular end-diastolic dimension 55 mm  Left ventricular end-systolic dimension 31 mm  Left ventricular end-diastolic volume 544 ml  Left ventricular end-systolic volume  56 ml  Stroke volume 83 ml  Cardiac Output 3 6 L/min  Ejection fraction 60 %  Left atrium 37 mm  Aortic Root 22 mm     Findings:    1  The left ventricle is normal in size with severe hypertrophy of the basal to distal anterior and anteroseptal wall  Left ventricular systolic function is normal with a measured ejection fraction of 60%   There are no regional left ventricular wall   motion abnormalities  2  The right ventricle is normal in size with normal right ventricular systolic function  3  The aortic, mitral, and tricuspid valves open without restriction  There is no significant valvular regurgitation, however cine MRI is inaccurate in the qualitative assessment of valvular regurgitation  4  The left atrium is normal in size  The aortic root is normal in size  5  There is no evidence of myocardial edema  6  Delayed post-gadolinium imaging demonstrates no region of hyperenhancement      IMPRESSION:  Impression:  1  Severe hypertrophy of the basal to distal anterior and anteroseptal wall  Normal left ventricular systolic function  2  Normal right ventricular size and systolic function  3  No significant valvular abnormalities  4  No evidence of myocardial scarring, inflammation, or infiltrative disease  5  These findings are suggestive of asymmetric hypertrophic cardiomyopathy          Workstation performed: [id number]         ======================================================    Lab Results   Component Value Date    WBC 10 16 09/11/2020    HGB 12 7 09/11/2020    HCT 40 7 09/11/2020    MCV 93 09/11/2020     09/11/2020      Lab Results   Component Value Date    SODIUM 133 (L) 09/11/2020    K 3 6 09/11/2020     09/11/2020    CO2 27 09/11/2020    BUN 10 09/11/2020    CREATININE 0 74 09/11/2020    GLUC 92 09/11/2020    CALCIUM 8 6 09/11/2020          Imaging:   I have personally reviewed pertinent reports  Portions of the record may have been created with voice recognition software  Occasional wrong word or "sound a like" substitutions may have occurred due to the inherent limitations of voice recognition software  Read the chart carefully and recognize, using context, where substitutions have occurred

## 2020-10-19 ENCOUNTER — OFFICE VISIT (OUTPATIENT)
Dept: FAMILY MEDICINE CLINIC | Facility: CLINIC | Age: 25
End: 2020-10-19
Payer: COMMERCIAL

## 2020-10-19 VITALS
HEIGHT: 64 IN | OXYGEN SATURATION: 98 % | TEMPERATURE: 97.6 F | RESPIRATION RATE: 14 BRPM | DIASTOLIC BLOOD PRESSURE: 68 MMHG | HEART RATE: 80 BPM | SYSTOLIC BLOOD PRESSURE: 134 MMHG | BODY MASS INDEX: 33.46 KG/M2 | WEIGHT: 196 LBS

## 2020-10-19 DIAGNOSIS — E01.0 THYROMEGALY: ICD-10-CM

## 2020-10-19 DIAGNOSIS — F41.9 ANXIETY: Primary | ICD-10-CM

## 2020-10-19 DIAGNOSIS — Z23 NEED FOR INFLUENZA VACCINATION: ICD-10-CM

## 2020-10-19 DIAGNOSIS — I42.2 OTHER HYPERTROPHIC CARDIOMYOPATHY (HCC): ICD-10-CM

## 2020-10-19 DIAGNOSIS — N92.6 IRREGULAR PERIODS: ICD-10-CM

## 2020-10-19 PROCEDURE — 99214 OFFICE O/P EST MOD 30 MIN: CPT | Performed by: INTERNAL MEDICINE

## 2020-10-19 RX ORDER — DULOXETIN HYDROCHLORIDE 20 MG/1
20 CAPSULE, DELAYED RELEASE ORAL DAILY
Qty: 30 CAPSULE | Refills: 2 | Status: SHIPPED | OUTPATIENT
Start: 2020-10-19 | End: 2021-01-11

## 2020-11-10 ENCOUNTER — TELEPHONE (OUTPATIENT)
Dept: FAMILY MEDICINE CLINIC | Facility: CLINIC | Age: 25
End: 2020-11-10

## 2020-11-10 DIAGNOSIS — Z20.822 COVID-19 RULED OUT: Primary | ICD-10-CM

## 2020-11-10 PROCEDURE — 99212 OFFICE O/P EST SF 10 MIN: CPT | Performed by: INTERNAL MEDICINE

## 2020-11-11 DIAGNOSIS — Z20.822 COVID-19 RULED OUT: ICD-10-CM

## 2020-11-11 PROCEDURE — U0003 INFECTIOUS AGENT DETECTION BY NUCLEIC ACID (DNA OR RNA); SEVERE ACUTE RESPIRATORY SYNDROME CORONAVIRUS 2 (SARS-COV-2) (CORONAVIRUS DISEASE [COVID-19]), AMPLIFIED PROBE TECHNIQUE, MAKING USE OF HIGH THROUGHPUT TECHNOLOGIES AS DESCRIBED BY CMS-2020-01-R: HCPCS | Performed by: INTERNAL MEDICINE

## 2020-11-12 ENCOUNTER — TELEPHONE (OUTPATIENT)
Dept: CARDIOLOGY CLINIC | Facility: CLINIC | Age: 25
End: 2020-11-12

## 2020-11-13 LAB — SARS-COV-2 RNA SPEC QL NAA+PROBE: NOT DETECTED

## 2020-11-24 ENCOUNTER — TELEPHONE (OUTPATIENT)
Dept: CARDIOLOGY CLINIC | Facility: CLINIC | Age: 25
End: 2020-11-24

## 2020-12-13 DIAGNOSIS — F41.9 ANXIETY: ICD-10-CM

## 2020-12-13 RX ORDER — DULOXETIN HYDROCHLORIDE 20 MG/1
20 CAPSULE, DELAYED RELEASE ORAL DAILY
Qty: 90 CAPSULE | Refills: 1 | OUTPATIENT
Start: 2020-12-13

## 2021-01-11 DIAGNOSIS — F41.9 ANXIETY: ICD-10-CM

## 2021-01-11 RX ORDER — DULOXETIN HYDROCHLORIDE 20 MG/1
20 CAPSULE, DELAYED RELEASE ORAL DAILY
Qty: 90 CAPSULE | Refills: 1 | Status: SHIPPED | OUTPATIENT
Start: 2021-01-11 | End: 2022-01-04

## 2021-03-19 ENCOUNTER — TELEPHONE (OUTPATIENT)
Dept: CARDIOLOGY CLINIC | Facility: CLINIC | Age: 26
End: 2021-03-19

## 2021-03-19 NOTE — TELEPHONE ENCOUNTER
3rd call  L/M/M asking to make appt w Dr Earnest Rangel  Referred by Dr Cline Fillers 2x's prior to sched    Left message both times

## 2021-05-19 ENCOUNTER — HOSPITAL ENCOUNTER (EMERGENCY)
Facility: HOSPITAL | Age: 26
Discharge: HOME | End: 2021-05-19
Attending: EMERGENCY MEDICINE | Admitting: EMERGENCY MEDICINE
Payer: COMMERCIAL

## 2021-05-19 ENCOUNTER — APPOINTMENT (EMERGENCY)
Dept: RADIOLOGY | Facility: HOSPITAL | Age: 26
End: 2021-05-19
Payer: COMMERCIAL

## 2021-05-19 VITALS
RESPIRATION RATE: 20 BRPM | BODY MASS INDEX: 33.3 KG/M2 | HEART RATE: 76 BPM | WEIGHT: 194 LBS | OXYGEN SATURATION: 97 % | DIASTOLIC BLOOD PRESSURE: 59 MMHG | TEMPERATURE: 98.2 F | SYSTOLIC BLOOD PRESSURE: 126 MMHG

## 2021-05-19 DIAGNOSIS — M79.644 PAIN OF FINGER OF RIGHT HAND: Primary | ICD-10-CM

## 2021-05-19 PROCEDURE — 99283 EMERGENCY DEPT VISIT LOW MDM: CPT | Performed by: EMERGENCY MEDICINE

## 2021-05-19 PROCEDURE — 73130 X-RAY EXAM OF HAND: CPT

## 2021-05-19 PROCEDURE — 99283 EMERGENCY DEPT VISIT LOW MDM: CPT

## 2021-05-19 NOTE — ED PROVIDER NOTES
History  Chief Complaint   Patient presents with    Finger Injury     Injury to 4th and 4th knuckles after banging on door on Thursday  Pt is a 26year old female presenting with right hand pain  Pt states she punched a door a few days ago and is now having right 4th and 5th knuckle pain  No swelling, redness, ecchymosis noted  Normal ROM of the fingers  Neurovascularly in tact distally  History provided by:  Patient   used: No    Hand Pain  Location:  Right 4th and 5th pain   Quality:  Sore  Severity:  Mild  Onset quality:  Sudden  Timing:  Constant  Progression:  Unchanged  Chronicity:  New  Context:  Punched door  Relieved by:  Nothing   Worsened by:  Palpation, movement   Ineffective treatments:  None tried       Prior to Admission Medications   Prescriptions Last Dose Informant Patient Reported? Taking? DULoxetine (CYMBALTA) 20 mg capsule Not Taking at Unknown time  No No   Sig: TAKE 1 CAPSULE (20 MG TOTAL) BY MOUTH DAILY WITH FOOD/BREAKFAST   Patient not taking: Reported on 5/19/2021      Facility-Administered Medications: None       Past Medical History:   Diagnosis Date    Disease of thyroid gland        History reviewed  No pertinent surgical history  Family History   Problem Relation Age of Onset    No Known Problems Mother     No Known Problems Father      I have reviewed and agree with the history as documented  E-Cigarette/Vaping    E-Cigarette Use Never User      E-Cigarette/Vaping Substances    Nicotine No     THC No     CBD No     Flavoring No      Social History     Tobacco Use    Smoking status: Never Smoker    Smokeless tobacco: Never Used   Substance Use Topics    Alcohol use: Not Currently     Drinks per session: 1 or 2     Binge frequency: Never    Drug use: Not Currently       Review of Systems   Constitutional: Negative  HENT: Negative  Respiratory: Negative  Cardiovascular: Negative  Gastrointestinal: Negative      Genitourinary: Negative  Musculoskeletal: Positive for arthralgias  Negative for joint swelling  Neurological: Negative  All other systems reviewed and are negative  Physical Exam  Physical Exam  Constitutional:       General: She is not in acute distress  Appearance: She is well-developed  She is not diaphoretic  HENT:      Head: Normocephalic and atraumatic  Right Ear: External ear normal       Left Ear: External ear normal       Nose: Nose normal    Eyes:      General: No scleral icterus  Right eye: No discharge  Left eye: No discharge  Extraocular Movements: Extraocular movements intact  Conjunctiva/sclera: Conjunctivae normal    Neck:      Musculoskeletal: Normal range of motion and neck supple  Cardiovascular:      Rate and Rhythm: Normal rate and regular rhythm  Pulses:           Radial pulses are 2+ on the right side  Heart sounds: Normal heart sounds  Pulmonary:      Effort: Pulmonary effort is normal       Breath sounds: Normal breath sounds  Musculoskeletal: Normal range of motion  Right wrist: Normal       Right hand: She exhibits tenderness and bony tenderness  She exhibits normal range of motion, normal two-point discrimination, normal capillary refill, no deformity, no laceration and no swelling  Normal sensation noted  Normal strength noted  Hands:    Skin:     General: Skin is warm and dry  Neurological:      Mental Status: She is alert and oriented to person, place, and time     Psychiatric:         Mood and Affect: Mood normal          Behavior: Behavior normal          Vital Signs  ED Triage Vitals [05/19/21 0252]   Temperature Pulse Respirations Blood Pressure SpO2   98 2 °F (36 8 °C) 76 20 126/59 97 %      Temp Source Heart Rate Source Patient Position - Orthostatic VS BP Location FiO2 (%)   Oral Monitor Sitting Right arm --      Pain Score       5           Vitals:    05/19/21 0252   BP: 126/59   Pulse: 76   Patient Position - Orthostatic VS: Sitting         Visual Acuity      ED Medications  Medications - No data to display    Diagnostic Studies  Results Reviewed     None                 XR hand 3+ views RIGHT    (Results Pending)              Procedures  Procedures         ED Course                                           MDM  Number of Diagnoses or Management Options  Pain of finger of right hand: new and requires workup     Amount and/or Complexity of Data Reviewed  Tests in the radiology section of CPT®: ordered and reviewed  Independent visualization of images, tracings, or specimens: yes    Risk of Complications, Morbidity, and/or Mortality  Presenting problems: low  Management options: low    Patient Progress  Patient progress: stable      Disposition  Final diagnoses:   Pain of finger of right hand     Time reflects when diagnosis was documented in both MDM as applicable and the Disposition within this note     Time User Action Codes Description Comment    5/19/2021  4:00 AM Amina Aviles Add [T72 [id number]] Pain of finger of right hand       ED Disposition     ED Disposition Condition Date/Time Comment    Discharge Good Wed May 19, 2021  4:00 AM Bessie Marx discharge to home/self care  Follow-up Information     Follow up With Specialties Details Why Jailyn Hughes MD Internal Medicine Schedule an appointment as soon as possible for a visit  As needed [facility] [place] [id number]  [phone]            Discharge Medication List as of 5/19/2021  4:00 AM      CONTINUE these medications which have NOT CHANGED    Details   DULoxetine (CYMBALTA) 20 mg capsule TAKE 1 CAPSULE (20 MG TOTAL) BY MOUTH DAILY WITH FOOD/BREAKFAST, Starting Mon 1/11/2021, Normal           No discharge procedures on file      PDMP Review       Value Time User    PDMP Reviewed  Yes 4/9/2020  2:11 Torres Amaya MD          ED Provider  Electronically Signed by           Lauri Barahona PA-C  05/19/21 1293

## 2021-12-30 ENCOUNTER — HOSPITAL ENCOUNTER (EMERGENCY)
Facility: HOSPITAL | Age: 26
Discharge: LEFT BEFORE DISCHARGE | End: 2021-12-30
Payer: COMMERCIAL

## 2021-12-30 VITALS
BODY MASS INDEX: 33.07 KG/M2 | RESPIRATION RATE: 21 BRPM | SYSTOLIC BLOOD PRESSURE: 106 MMHG | DIASTOLIC BLOOD PRESSURE: 59 MMHG | OXYGEN SATURATION: 100 % | TEMPERATURE: 99.7 F | WEIGHT: 192.68 LBS | HEART RATE: 109 BPM

## 2021-12-30 LAB
ATRIAL RATE: 106 BPM
P AXIS: 77 DEGREES
PR INTERVAL: 138 MS
QRS AXIS: -48 DEGREES
QRSD INTERVAL: 84 MS
QT INTERVAL: 352 MS
QTC INTERVAL: 467 MS
T WAVE AXIS: 82 DEGREES
VENTRICULAR RATE: 106 BPM

## 2021-12-30 PROCEDURE — 93005 ELECTROCARDIOGRAM TRACING: CPT

## 2021-12-30 PROCEDURE — 93010 ELECTROCARDIOGRAM REPORT: CPT | Performed by: INTERNAL MEDICINE

## 2022-01-04 ENCOUNTER — TELEPHONE (OUTPATIENT)
Dept: FAMILY MEDICINE CLINIC | Facility: CLINIC | Age: 27
End: 2022-01-04

## 2022-01-04 ENCOUNTER — TELEMEDICINE (OUTPATIENT)
Dept: FAMILY MEDICINE CLINIC | Facility: CLINIC | Age: 27
End: 2022-01-04
Payer: COMMERCIAL

## 2022-01-04 DIAGNOSIS — J06.9 ACUTE URI: ICD-10-CM

## 2022-01-04 DIAGNOSIS — M25.562 ACUTE PAIN OF BOTH KNEES: ICD-10-CM

## 2022-01-04 DIAGNOSIS — U07.1 LAB TEST POSITIVE FOR DETECTION OF COVID-19 VIRUS: Primary | ICD-10-CM

## 2022-01-04 DIAGNOSIS — M25.561 ACUTE PAIN OF BOTH KNEES: ICD-10-CM

## 2022-01-04 PROCEDURE — 99214 OFFICE O/P EST MOD 30 MIN: CPT | Performed by: INTERNAL MEDICINE

## 2022-01-04 RX ORDER — CELECOXIB 200 MG/1
200 CAPSULE ORAL DAILY
Qty: 30 CAPSULE | Refills: 1 | Status: SHIPPED | OUTPATIENT
Start: 2022-01-04

## 2022-01-04 NOTE — TELEPHONE ENCOUNTER
Patient had PCR test done 12/29 and was positive  Patient's job is requiring her to get another test done because she has to travel  Does she need to get a home test or another PCR? Please advise

## 2022-01-05 PROCEDURE — U0005 INFEC AGEN DETEC AMPLI PROBE: HCPCS | Performed by: INTERNAL MEDICINE

## 2022-01-05 PROCEDURE — U0003 INFECTIOUS AGENT DETECTION BY NUCLEIC ACID (DNA OR RNA); SEVERE ACUTE RESPIRATORY SYNDROME CORONAVIRUS 2 (SARS-COV-2) (CORONAVIRUS DISEASE [COVID-19]), AMPLIFIED PROBE TECHNIQUE, MAKING USE OF HIGH THROUGHPUT TECHNOLOGIES AS DESCRIBED BY CMS-2020-01-R: HCPCS | Performed by: INTERNAL MEDICINE

## 2022-01-05 NOTE — PROGRESS NOTES
Virtual Regular Visit    Verification of patient location:    Patient is located in the following state in which I hold an active license [place]      Assessment/Plan:    Problem List Items Addressed This Visit        Respiratory    Acute URI     Bed rest  Increase Po fluids  dayquil and Nyquil         Relevant Orders    COVID Only- Collected at [facility]       Other    Lab test positive for detection of COVID-19 virus - Primary     Isolation for total of 10 days  Supportive mangt  RTC in 1 mo w Blood  work         Relevant Orders    COVID Only- Collected at [facility]    COVID Only- Collected at [facility]    Acute pain of both knees     Moist Heat  Home P T   RTC in 1mo w X rays and Blood work  Try; celebrex 200 mg daily         Relevant Medications    celecoxib (CeleBREX) 200 mg capsule    Other Relevant Orders    Sedimentation rate, automated    C-reactive protein    ASO Screen w/ reflex to Titer    XR knee 3 vw left non injury    XR knee 3 vw right non injury    DIYA Screen w/ Reflex to Titer/Pattern    Anti-DNA antibody, double-stranded    RF Screen w/ Reflex to Titer    Sjogren's Antibodies    UA (URINE) with reflex to Scope    Comprehensive metabolic panel    CBC and differential    TSH, 3rd generation    T4, free               Reason for visit is   Chief Complaint   Patient presents with    Virtual Regular Visit    Virtual Regular Visit        Encounter provider Toby Doty MD    Provider located at [address] [address] [phone]      Recent Visits  No visits were found meeting these conditions    Showing recent visits within past 7 days and meeting all other requirements  Today's Visits  Date Type Provider Dept   01/04/22 Telemedicine Toby Doty MD [facility]   01/04/22 Telephone Kelsie Edward [facility]   Showing today's visits and meeting all other requirements  Future Appointments  No visits were found meeting these conditions  Showing future appointments within next 150 days and meeting all other requirements       The patient was identified by name and date of birth  [place] was informed that this is a telemedicine visit and that the visit is being conducted through [address] [facility] and patient was informed that this is a secure, HIPAA-compliant platform  She agrees to proceed     My office door was closed  No one else was in the room  She acknowledged consent and understanding of privacy and security of the video platform  The patient has agreed to participate and understands they can discontinue the visit at any time  Patient is aware this is a billable service  Carolann Shah is a 23 y o  female is here for Virtual Visit as below;        [address]  is here for Virtual Visit , she has few symptoms, was tested positive for covid, and has Mild cold symptoms,  Past Medical History:   Diagnosis Date    Disease of thyroid gland        History reviewed  No pertinent surgical history  Current Outpatient Medications   Medication Sig Dispense Refill    celecoxib (CeleBREX) 200 mg capsule Take 1 capsule (200 mg total) by mouth daily With food/breakfast 30 capsule 1     No current facility-administered medications for this visit  Allergies   Allergen Reactions    Bee Venom Swelling       Review of Systems   Constitutional: Negative for chills, fatigue and fever  HENT: Positive for congestion and postnasal drip  Negative for facial swelling, sore throat, trouble swallowing and voice change  Eyes: Negative for pain, discharge and visual disturbance  Respiratory: Positive for cough  Negative for shortness of breath and wheezing  Cardiovascular: Negative for chest pain, palpitations and leg swelling  Gastrointestinal: Negative for abdominal pain, blood in stool, constipation, diarrhea and nausea  Endocrine: Negative for polydipsia, polyphagia and polyuria  Genitourinary: Negative for difficulty urinating, hematuria and urgency  Musculoskeletal: Positive for arthralgias  Negative for myalgias  Skin: Negative for rash  Neurological: Negative for dizziness, tremors, weakness and headaches  Hematological: Negative for adenopathy  Does not bruise/bleed easily  Psychiatric/Behavioral: Negative for dysphoric mood, sleep disturbance and suicidal ideas  Video Exam    There were no vitals filed for this visit  Physical Exam  Constitutional:       General: She is not in acute distress  HENT:      Head: Normocephalic  Mouth/Throat:      Pharynx: No oropharyngeal exudate  Eyes:      General: No scleral icterus  Conjunctiva/sclera: Conjunctivae normal       Pupils: Pupils are equal, round, and reactive to light  Neck:      Thyroid: No thyromegaly  Cardiovascular:      Rate and Rhythm: Normal rate and regular rhythm  Heart sounds: Normal heart sounds  No murmur heard  Pulmonary:      Effort: Pulmonary effort is normal  No respiratory distress  Breath sounds: Normal breath sounds  No wheezing or rales  Abdominal:      General: Bowel sounds are normal  There is no distension  Palpations: Abdomen is soft  Tenderness: There is no abdominal tenderness  There is no guarding or rebound  Musculoskeletal:         General: Tenderness present  Cervical back: Neck supple  Lymphadenopathy:      Cervical: No cervical adenopathy  Skin:     Coloration: Skin is not pale  Findings: No rash  Neurological:      Mental Status: She is alert and oriented to person, place, and time  Sensory: No sensory deficit  Motor: No weakness  I spent 20 minutes directly with the patient during this visit    VIRTUAL VISIT Reed Long verbally agrees to participate in [employer]   Pt is aware that Virtual Care Services could be limited without vital signs or the ability to perform a full hands-on physical Venancio Burrell understands she or the provider may request at any time to terminate the video visit and request the patient to seek care or treatment in person

## 2022-10-12 PROBLEM — J06.9 ACUTE URI: Status: RESOLVED | Noted: 2022-01-04 | Resolved: 2022-10-12

## 2023-10-10 ENCOUNTER — OFFICE VISIT (OUTPATIENT)
Dept: FAMILY MEDICINE CLINIC | Facility: CLINIC | Age: 28
End: 2023-10-10

## 2023-10-10 VITALS
OXYGEN SATURATION: 99 % | HEIGHT: 63 IN | RESPIRATION RATE: 14 BRPM | HEART RATE: 85 BPM | BODY MASS INDEX: 36.86 KG/M2 | WEIGHT: 208 LBS | SYSTOLIC BLOOD PRESSURE: 112 MMHG | TEMPERATURE: 98.8 F | DIASTOLIC BLOOD PRESSURE: 62 MMHG

## 2023-10-10 DIAGNOSIS — E04.1 THYROID NODULE: ICD-10-CM

## 2023-10-10 DIAGNOSIS — Z00.01 ENCOUNTER FOR GENERAL ADULT MEDICAL EXAMINATION WITH ABNORMAL FINDINGS: Primary | ICD-10-CM

## 2023-10-10 DIAGNOSIS — Z11.4 SCREENING FOR HIV (HUMAN IMMUNODEFICIENCY VIRUS): ICD-10-CM

## 2023-10-10 DIAGNOSIS — Z11.59 NEED FOR HEPATITIS C SCREENING TEST: ICD-10-CM

## 2023-10-10 DIAGNOSIS — I42.2 OTHER HYPERTROPHIC CARDIOMYOPATHY (HCC): ICD-10-CM

## 2023-10-10 DIAGNOSIS — Z12.4 SCREENING FOR CERVICAL CANCER: ICD-10-CM

## 2023-10-10 LAB
SL AMB  POCT GLUCOSE, UA: NEGATIVE
SL AMB LEUKOCYTE ESTERASE,UA: NEGATIVE
SL AMB POCT BILIRUBIN,UA: NEGATIVE
SL AMB POCT BLOOD,UA: NEGATIVE
SL AMB POCT CLARITY,UA: CLEAR
SL AMB POCT COLOR,UA: YELLOW
SL AMB POCT HEMOGLOBIN AIC: 5.4 (ref ?–6.5)
SL AMB POCT KETONES,UA: NEGATIVE
SL AMB POCT NITRITE,UA: NEGATIVE
SL AMB POCT PH,UA: 6
SL AMB POCT SPECIFIC GRAVITY,UA: 1.01
SL AMB POCT URINE PROTEIN: NEGATIVE
SL AMB POCT UROBILINOGEN: 0.2

## 2023-10-10 PROCEDURE — 83036 HEMOGLOBIN GLYCOSYLATED A1C: CPT | Performed by: INTERNAL MEDICINE

## 2023-10-10 PROCEDURE — 81002 URINALYSIS NONAUTO W/O SCOPE: CPT | Performed by: INTERNAL MEDICINE

## 2023-10-10 PROCEDURE — 99214 OFFICE O/P EST MOD 30 MIN: CPT | Performed by: INTERNAL MEDICINE

## 2023-10-10 PROCEDURE — 99395 PREV VISIT EST AGE 18-39: CPT | Performed by: INTERNAL MEDICINE

## 2023-10-10 PROCEDURE — 93000 ELECTROCARDIOGRAM COMPLETE: CPT | Performed by: INTERNAL MEDICINE

## 2023-10-10 NOTE — PROGRESS NOTES
BMI Counseling: Body mass index is 36.85 kg/m². The BMI is above normal. Nutrition recommendations include reducing portion sizes.

## 2023-10-10 NOTE — PROGRESS NOTES
Name: Trinh German      : 1995      MRN: [id number]  Encounter Provider: Becky Washburn MD  Encounter Date: 10/10/2023   Encounter department: [address] [place] [address]     1. Encounter for general adult medical examination with abnormal findings  Comments:  done in detail, RTC in 1-2 mos w Blood work,... Orders:  -     POCT hemoglobin A1c  -     POCT urine dip  -     POCT ECG  -     UA (URINE) with reflex to Scope; Future; Expected date: 10/17/2023  -     Echo complete w/ contrast if indicated; Future; Expected date: 10/10/2023  -     Comprehensive metabolic panel; Future  -     CBC and differential; Future  -     Ferritin; Future  -     Magnesium; Future  -     Lipid panel; Future  -     TSH, 3rd generation; Future; Expected date: 10/10/2023  -     T4, free; Future; Expected date: 10/10/2023  -     US thyroid; Future; Expected date: 10/10/2023  -     Thyroid Antibodies Panel; Future  -     Vitamin D 25 hydroxy; Future; Expected date: 10/10/2023  -     Vitamin B12; Future    2. Screening for cervical cancer  -     Ambulatory referral to Obstetrics / Gynecology; Future    3. Need for hepatitis C screening test  -     Hepatitis C Antibody; Future    4. Screening for HIV (human immunodeficiency virus)  -     HIV 1/2 AG/AB w Reflex [facility] for 2 yr old and above; Future    5. Asymmetrical septal hypertrophic cardiomyopathy (HCC)  Comments:  life style mod, Echocardiogram, consider cardiology consult,... Orders:  -     POCT hemoglobin A1c  -     POCT urine dip  -     POCT ECG  -     Echo complete w/ contrast if indicated; Future; Expected date: 10/10/2023    6. Thyroid nodule  Comments:  RTC in 1-2 mos w Blood work  Orders:  -     TSH, 3rd generation; Future; Expected date: 10/10/2023  -     T4, free; Future; Expected date: 10/10/2023  -     US thyroid; Future; Expected date: 10/10/2023  -     Thyroid Antibodies Panel;  Future    life style mod  RTC in 1-2 mos w Blood work       Subjective      [address] [place] [address] is here for Annual physical Exam and Regular check up, No recent Blood work, Med list; None. .. Review of Systems   Constitutional: Negative for chills, fatigue and fever. HENT: Positive for postnasal drip. Negative for congestion, facial swelling, sore throat, trouble swallowing and voice change. Eyes: Negative for pain, discharge and visual disturbance. Respiratory: Negative for cough, shortness of breath and wheezing. Cardiovascular: Negative for chest pain, palpitations and leg swelling. Gastrointestinal: Negative for abdominal pain, blood in stool, constipation, diarrhea and nausea. Endocrine: Negative for polydipsia, polyphagia and polyuria. Genitourinary: Negative for difficulty urinating, hematuria and urgency. Musculoskeletal: Negative for arthralgias and myalgias. Skin: Negative for rash. Neurological: Negative for dizziness, tremors, weakness and headaches. Hematological: Negative for adenopathy. Does not bruise/bleed easily. Psychiatric/Behavioral: Negative for dysphoric mood, sleep disturbance and suicidal ideas. Current Outpatient Medications on File Prior to Visit   Medication Sig   • celecoxib (CeleBREX) 200 mg capsule Take 1 capsule (200 mg total) by mouth daily With food/breakfast       Objective     /62 (BP Location: Left arm, Patient Position: Sitting, Cuff Size: Standard)   Pulse 85   Temp 98.8 °F (37.1 °C) (Tympanic)   Resp 14   Ht 5' 3" (1.6 m)   Wt 94.3 kg (208 lb)   SpO2 99%   BMI 36.85 kg/m²     Physical Exam  Constitutional:       General: She is not in acute distress. HENT:      Head: Normocephalic. Mouth/Throat:      Pharynx: No oropharyngeal exudate. Eyes:      General: No scleral icterus. Conjunctiva/sclera: Conjunctivae normal.      Pupils: Pupils are equal, round, and reactive to light. Neck:      Thyroid: No thyromegaly.    Cardiovascular:      Rate and Rhythm: Normal rate and regular rhythm. Heart sounds: Murmur heard. Pulmonary:      Effort: Pulmonary effort is normal. No respiratory distress. Breath sounds: Normal breath sounds. No wheezing or rales. Abdominal:      General: Bowel sounds are normal. There is no distension. Palpations: Abdomen is soft. Tenderness: There is no abdominal tenderness. There is no guarding or rebound. Musculoskeletal:         General: No tenderness. Cervical back: Neck supple. Lymphadenopathy:      Cervical: No cervical adenopathy. Skin:     Coloration: Skin is not pale. Findings: No rash. Neurological:      Mental Status: She is alert and oriented to person, place, and time. Sensory: No sensory deficit. Motor: No weakness.        Brigida Charles MD

## 2024-04-17 ENCOUNTER — TELEPHONE (OUTPATIENT)
Age: 29
End: 2024-04-17

## 2024-04-17 NOTE — TELEPHONE ENCOUNTER
"Patient called in requesting blood work for \"[employer]\". She states she needs them for work and is unsure when she last had them done. She would like a call letting her know if she needs an appointment or if they can just be ordered. Please call patient to advise.   "

## 2024-04-19 ENCOUNTER — OFFICE VISIT (OUTPATIENT)
Dept: FAMILY MEDICINE CLINIC | Facility: CLINIC | Age: 29
End: 2024-04-19

## 2024-04-19 VITALS
BODY MASS INDEX: 36.85 KG/M2 | SYSTOLIC BLOOD PRESSURE: 120 MMHG | TEMPERATURE: 98.2 F | OXYGEN SATURATION: 99 % | HEART RATE: 88 BPM | DIASTOLIC BLOOD PRESSURE: 72 MMHG | RESPIRATION RATE: 16 BRPM | HEIGHT: 63 IN

## 2024-04-19 DIAGNOSIS — Z12.4 SCREENING FOR CERVICAL CANCER: ICD-10-CM

## 2024-04-19 DIAGNOSIS — E04.1 THYROID NODULE: ICD-10-CM

## 2024-04-19 DIAGNOSIS — K21.9 GASTROESOPHAGEAL REFLUX DISEASE WITHOUT ESOPHAGITIS: ICD-10-CM

## 2024-04-19 DIAGNOSIS — Z00.01 ENCOUNTER FOR GENERAL ADULT MEDICAL EXAMINATION WITH ABNORMAL FINDINGS: Primary | ICD-10-CM

## 2024-04-19 DIAGNOSIS — I42.2 OTHER HYPERTROPHIC CARDIOMYOPATHY (HCC): ICD-10-CM

## 2024-04-19 PROCEDURE — 99214 OFFICE O/P EST MOD 30 MIN: CPT | Performed by: INTERNAL MEDICINE

## 2024-04-19 PROCEDURE — 99395 PREV VISIT EST AGE 18-39: CPT | Performed by: INTERNAL MEDICINE

## 2024-04-19 NOTE — PROGRESS NOTES
Name: Alexandria Peraza      : 1995      MRN: [id number]  Encounter Provider: Da Wei MD  Encounter Date: 2024   Encounter department: [facility]    Assessment & Plan     1. Encounter for general adult medical examination with abnormal findings  Comments:  done in Detail  form to fill out  Life style Mod  RTC in 3 mos w Blood work  Orders:  -     Echo complete w/ contrast if indicated; Future; Expected date: 2024  -     TSH, 3rd generation; Future; Expected date: 2024  -     T4, free; Future; Expected date: 2024  -     US thyroid; Future; Expected date: 2024  -     Thyroid Antibodies Panel; Future  -     H. pylori antigen, stool; Future  -     UA (URINE) with reflex to Scope; Future  -     Vitamin B12; Future  -     Vitamin D 25 hydroxy; Future; Expected date: 2024  -     Magnesium; Future  -     Lipid panel; Future; Expected date: 2024  -     Iron Panel (Includes Ferritin, Iron Sat%, Iron, and TIBC); Future; Expected date: 2024  -     CBC and differential; Future; Expected date: 2024  -     Comprehensive metabolic panel; Future; Expected date: 10/19/2024    2. Screening for cervical cancer  -     Ambulatory referral to Obstetrics / Gynecology; Future    3. Other hypertrophic cardiomyopathy (HCC)  Comments:  repeat Echocardiogram  consider Stress test  life style Mod  RTC in 3 mos w Blood work  Orders:  -     Echo complete w/ contrast if indicated; Future; Expected date: 2024    4. Thyroid nodule  -     TSH, 3rd generation; Future; Expected date: 2024  -     T4, free; Future; Expected date: 2024  -     US thyroid; Future; Expected date: 2024  -     Thyroid Antibodies Panel; Future    5. Gastroesophageal reflux disease without esophagitis  -     H. pylori antigen, stool; Future    Annual Physical Exam : done in detail  Life style Mod  RTC in 3 mos w Blood work  Pt Understands to get  "echocardiogram ASAP       Subjective      28 Y O lady is here for Annual Physical Exam and Regular check Up, she feels OK, No recent Blood work, No Med Ins...      Review of Systems   Constitutional:  Positive for fatigue. Negative for chills and fever.   HENT:  Negative for congestion, facial swelling, sore throat, trouble swallowing and voice change.    Eyes:  Negative for pain, discharge and visual disturbance.   Respiratory:  Negative for cough, shortness of breath and wheezing.    Cardiovascular:  Negative for chest pain, palpitations and leg swelling.   Gastrointestinal:  Negative for abdominal pain, blood in stool, constipation, diarrhea and nausea.   Endocrine: Negative for polydipsia, polyphagia and polyuria.   Genitourinary:  Negative for difficulty urinating, hematuria and urgency.   Musculoskeletal:  Negative for arthralgias and myalgias.   Skin:  Negative for rash.   Neurological:  Negative for dizziness, tremors, weakness and headaches.   Hematological:  Negative for adenopathy. Does not bruise/bleed easily.   Psychiatric/Behavioral:  Negative for dysphoric mood, sleep disturbance and suicidal ideas.        Current Outpatient Medications on File Prior to Visit   Medication Sig    [DISCONTINUED] celecoxib (CeleBREX) 200 mg capsule Take 1 capsule (200 mg total) by mouth daily With food/breakfast       Objective     /72 (BP Location: Left arm, Patient Position: Sitting, Cuff Size: Standard)   Pulse 88   Temp 98.2 °F (36.8 °C) (Tympanic)   Resp 16   Ht 5' 3\" (1.6 m)   SpO2 99%   BMI 36.85 kg/m²     Physical Exam  Constitutional:       General: She is not in acute distress.  HENT:      Head: Normocephalic.      Mouth/Throat:      Pharynx: No oropharyngeal exudate.   Eyes:      General: No scleral icterus.     Conjunctiva/sclera: Conjunctivae normal.      Pupils: Pupils are equal, round, and reactive to light.   Neck:      Thyroid: No thyromegaly.   Cardiovascular:      Rate and Rhythm: Normal " rate and regular rhythm.      Heart sounds: Murmur heard.   Pulmonary:      Effort: Pulmonary effort is normal. No respiratory distress.      Breath sounds: Normal breath sounds. No wheezing or rales.   Abdominal:      General: Bowel sounds are normal. There is no distension.      Palpations: Abdomen is soft.      Tenderness: There is no abdominal tenderness. There is no guarding or rebound.   Musculoskeletal:         General: No tenderness.      Cervical back: Neck supple.   Lymphadenopathy:      Cervical: No cervical adenopathy.   Skin:     Coloration: Skin is not pale.      Findings: No rash.   Neurological:      Mental Status: She is alert and oriented to person, place, and time.      Sensory: No sensory deficit.      Motor: No weakness.       Da Wei MD

## 2024-06-19 ENCOUNTER — TELEPHONE (OUTPATIENT)
Age: 29
End: 2024-06-19

## 2024-06-19 DIAGNOSIS — Z11.1 SCREENING FOR TUBERCULOSIS: Primary | ICD-10-CM

## 2024-06-19 NOTE — TELEPHONE ENCOUNTER
Pt needs both a TDAP, and two step ppd for employment asap. Cannot be scheduled until orders are in for these. Please place orders and call back patient to schedule nurse visit.

## 2024-06-19 NOTE — TELEPHONE ENCOUNTER
L/m on phone. TB quantiferon b/w is in chart, she can go for b/w anytime. Also, she has to come in this week, today before 4:30, Thursday, before 3 pm or Friday before 12 pm for TDAP because Dr min will be out of the office next week.  If patient calls, please transfer to Laurita at the office so I can schedule the nurse visit

## 2024-06-20 ENCOUNTER — APPOINTMENT (OUTPATIENT)
Dept: LAB | Age: 29
End: 2024-06-20

## 2024-06-20 ENCOUNTER — TELEPHONE (OUTPATIENT)
Dept: FAMILY MEDICINE CLINIC | Facility: CLINIC | Age: 29
End: 2024-06-20

## 2024-06-20 DIAGNOSIS — Z00.01 ENCOUNTER FOR GENERAL ADULT MEDICAL EXAMINATION WITH ABNORMAL FINDINGS: ICD-10-CM

## 2024-06-20 DIAGNOSIS — Z11.4 SCREENING FOR HIV (HUMAN IMMUNODEFICIENCY VIRUS): ICD-10-CM

## 2024-06-20 DIAGNOSIS — Z11.1 SCREENING FOR TUBERCULOSIS: ICD-10-CM

## 2024-06-20 DIAGNOSIS — Z11.59 NEED FOR HEPATITIS C SCREENING TEST: ICD-10-CM

## 2024-06-20 DIAGNOSIS — E04.1 THYROID NODULE: ICD-10-CM

## 2024-06-20 LAB
25(OH)D3 SERPL-MCNC: <7 NG/ML (ref 30–100)
BACTERIA UR QL AUTO: ABNORMAL /HPF
BASOPHILS # BLD AUTO: 0.05 THOUSANDS/ÂΜL (ref 0–0.1)
BASOPHILS NFR BLD AUTO: 1 % (ref 0–1)
BILIRUB UR QL STRIP: NEGATIVE
CLARITY UR: CLEAR
COLOR UR: ABNORMAL
EOSINOPHIL # BLD AUTO: 0.13 THOUSAND/ÂΜL (ref 0–0.61)
EOSINOPHIL NFR BLD AUTO: 2 % (ref 0–6)
ERYTHROCYTE [DISTWIDTH] IN BLOOD BY AUTOMATED COUNT: 13.2 % (ref 11.6–15.1)
FERRITIN SERPL-MCNC: 15 NG/ML (ref 11–307)
GLUCOSE UR STRIP-MCNC: NEGATIVE MG/DL
HCT VFR BLD AUTO: 43.4 % (ref 34.8–46.1)
HGB BLD-MCNC: 13.8 G/DL (ref 11.5–15.4)
HGB UR QL STRIP.AUTO: NEGATIVE
IMM GRANULOCYTES # BLD AUTO: 0.01 THOUSAND/UL (ref 0–0.2)
IMM GRANULOCYTES NFR BLD AUTO: 0 % (ref 0–2)
IRON SATN MFR SERPL: 19 % (ref 15–50)
IRON SERPL-MCNC: 70 UG/DL (ref 50–212)
KETONES UR STRIP-MCNC: NEGATIVE MG/DL
LEUKOCYTE ESTERASE UR QL STRIP: NEGATIVE
LYMPHOCYTES # BLD AUTO: 1.72 THOUSANDS/ÂΜL (ref 0.6–4.47)
LYMPHOCYTES NFR BLD AUTO: 32 % (ref 14–44)
MAGNESIUM SERPL-MCNC: 2.2 MG/DL (ref 1.9–2.7)
MCH RBC QN AUTO: 30.1 PG (ref 26.8–34.3)
MCHC RBC AUTO-ENTMCNC: 31.8 G/DL (ref 31.4–37.4)
MCV RBC AUTO: 95 FL (ref 82–98)
MONOCYTES # BLD AUTO: 0.37 THOUSAND/ÂΜL (ref 0.17–1.22)
MONOCYTES NFR BLD AUTO: 7 % (ref 4–12)
NEUTROPHILS # BLD AUTO: 3.14 THOUSANDS/ÂΜL (ref 1.85–7.62)
NEUTS SEG NFR BLD AUTO: 58 % (ref 43–75)
NITRITE UR QL STRIP: NEGATIVE
NON-SQ EPI CELLS URNS QL MICRO: ABNORMAL /HPF
NRBC BLD AUTO-RTO: 0 /100 WBCS
PH UR STRIP.AUTO: 5.5 [PH]
PLATELET # BLD AUTO: 277 THOUSANDS/UL (ref 149–390)
PMV BLD AUTO: 11.2 FL (ref 8.9–12.7)
PROT UR STRIP-MCNC: ABNORMAL MG/DL
RBC # BLD AUTO: 4.58 MILLION/UL (ref 3.81–5.12)
RBC #/AREA URNS AUTO: ABNORMAL /HPF
SP GR UR STRIP.AUTO: 1.02 (ref 1–1.03)
T4 FREE SERPL-MCNC: 0.67 NG/DL (ref 0.61–1.12)
TIBC SERPL-MCNC: 374 UG/DL (ref 250–450)
TSH SERPL DL<=0.05 MIU/L-ACNC: 3.67 UIU/ML (ref 0.45–4.5)
UIBC SERPL-MCNC: 304 UG/DL (ref 155–355)
UROBILINOGEN UR STRIP-ACNC: <2 MG/DL
VIT B12 SERPL-MCNC: 140 PG/ML (ref 180–914)
WBC # BLD AUTO: 5.42 THOUSAND/UL (ref 4.31–10.16)
WBC #/AREA URNS AUTO: ABNORMAL /HPF

## 2024-06-20 PROCEDURE — 36415 COLL VENOUS BLD VENIPUNCTURE: CPT

## 2024-06-20 PROCEDURE — 86800 THYROGLOBULIN ANTIBODY: CPT

## 2024-06-20 PROCEDURE — 85025 COMPLETE CBC W/AUTO DIFF WBC: CPT

## 2024-06-20 PROCEDURE — 84439 ASSAY OF FREE THYROXINE: CPT

## 2024-06-20 PROCEDURE — 86803 HEPATITIS C AB TEST: CPT

## 2024-06-20 PROCEDURE — 86376 MICROSOMAL ANTIBODY EACH: CPT

## 2024-06-20 PROCEDURE — 82728 ASSAY OF FERRITIN: CPT

## 2024-06-20 PROCEDURE — 82607 VITAMIN B-12: CPT

## 2024-06-20 PROCEDURE — 83735 ASSAY OF MAGNESIUM: CPT

## 2024-06-20 PROCEDURE — 81001 URINALYSIS AUTO W/SCOPE: CPT

## 2024-06-20 PROCEDURE — 82306 VITAMIN D 25 HYDROXY: CPT

## 2024-06-20 PROCEDURE — 83550 IRON BINDING TEST: CPT

## 2024-06-20 PROCEDURE — 83540 ASSAY OF IRON: CPT

## 2024-06-20 PROCEDURE — 87389 HIV-1 AG W/HIV-1&-2 AB AG IA: CPT

## 2024-06-20 PROCEDURE — 86480 TB TEST CELL IMMUN MEASURE: CPT

## 2024-06-20 PROCEDURE — 84443 ASSAY THYROID STIM HORMONE: CPT

## 2024-06-20 NOTE — TELEPHONE ENCOUNTER
Pt returned call looking for Laurita. As per chart message warm transferred the call to practice clinical Laurita answered patient call and helped with appt. Thanks

## 2024-06-21 ENCOUNTER — TELEPHONE (OUTPATIENT)
Age: 29
End: 2024-06-21

## 2024-06-21 ENCOUNTER — CLINICAL SUPPORT (OUTPATIENT)
Dept: FAMILY MEDICINE CLINIC | Facility: CLINIC | Age: 29
End: 2024-06-21

## 2024-06-21 DIAGNOSIS — Z23 ENCOUNTER FOR IMMUNIZATION: ICD-10-CM

## 2024-06-21 DIAGNOSIS — E53.8 VITAMIN B12 DEFICIENCY: Primary | ICD-10-CM

## 2024-06-21 DIAGNOSIS — E55.9 VITAMIN D DEFICIENCY: ICD-10-CM

## 2024-06-21 LAB
GAMMA INTERFERON BACKGROUND BLD IA-ACNC: 0.01 IU/ML
HCV AB SER QL: NORMAL
HIV 1+2 AB+HIV1 P24 AG SERPL QL IA: NORMAL
HIV 2 AB SERPL QL IA: NORMAL
HIV1 AB SERPL QL IA: NORMAL
HIV1 P24 AG SERPL QL IA: NORMAL
M TB IFN-G BLD-IMP: NEGATIVE
M TB IFN-G CD4+ BCKGRND COR BLD-ACNC: 0.03 IU/ML
M TB IFN-G CD4+ BCKGRND COR BLD-ACNC: 0.03 IU/ML
MITOGEN IGNF BCKGRD COR BLD-ACNC: 9.99 IU/ML
THYROGLOB AB SERPL-ACNC: 6 IU/ML (ref 0–0.9)
THYROPEROXIDASE AB SERPL-ACNC: 106 IU/ML (ref 0–34)

## 2024-06-21 PROCEDURE — 90471 IMMUNIZATION ADMIN: CPT

## 2024-06-21 PROCEDURE — 90715 TDAP VACCINE 7 YRS/> IM: CPT

## 2024-06-21 PROCEDURE — 96372 THER/PROPH/DIAG INJ SC/IM: CPT | Performed by: INTERNAL MEDICINE

## 2024-06-21 RX ORDER — LANOLIN ALCOHOL/MO/W.PET/CERES
1000 CREAM (GRAM) TOPICAL DAILY
Qty: 90 TABLET | Refills: 3 | Status: SHIPPED | OUTPATIENT
Start: 2024-06-21

## 2024-06-21 RX ORDER — ERGOCALCIFEROL 1.25 MG/1
CAPSULE ORAL
Qty: 30 CAPSULE | Refills: 1 | Status: SHIPPED | OUTPATIENT
Start: 2024-06-21

## 2024-06-21 RX ORDER — CYANOCOBALAMIN 1000 UG/ML
1000 INJECTION, SOLUTION INTRAMUSCULAR; SUBCUTANEOUS ONCE
Status: COMPLETED | OUTPATIENT
Start: 2024-06-21 | End: 2024-06-21

## 2024-06-21 RX ADMIN — CYANOCOBALAMIN 1000 MCG: 1000 INJECTION, SOLUTION INTRAMUSCULAR; SUBCUTANEOUS at 08:42

## 2024-06-21 NOTE — TELEPHONE ENCOUNTER
[facility] pharmacy contacted the office this morning wanting to get clarification on the ergocalciferol (VITAMIN D2) 50,000 units that was sent today. Dispense Quantity: 30 Sig: Take one cap daily for two weeks, then take one cap Weekly...Wanting to confirm this is the correct dosing for prescription. Can contact pharmacy by phone.

## 2024-10-17 ENCOUNTER — TELEPHONE (OUTPATIENT)
Age: 29
End: 2024-10-17

## 2024-10-17 NOTE — TELEPHONE ENCOUNTER
Patient had a james horse during the night.  There is no redness and it is not hot to the touch.  She is feeling better now with no pain, and just missed the 1 day of work. Please advise

## 2024-10-17 NOTE — TELEPHONE ENCOUNTER
Patient called the office requesting for a work note from 10/16/2024 due to leg pain. Appointment was offered to pt however she refused would just like to get a work note. Please review and advise